# Patient Record
Sex: MALE | Race: WHITE | NOT HISPANIC OR LATINO | Employment: UNEMPLOYED | URBAN - METROPOLITAN AREA
[De-identification: names, ages, dates, MRNs, and addresses within clinical notes are randomized per-mention and may not be internally consistent; named-entity substitution may affect disease eponyms.]

---

## 2017-05-02 ENCOUNTER — HOSPITAL ENCOUNTER (EMERGENCY)
Facility: HOSPITAL | Age: 37
Discharge: HOME | End: 2017-05-03
Attending: EMERGENCY MEDICINE
Payer: MEDICAID

## 2017-05-02 ENCOUNTER — HOSPITAL ENCOUNTER (EMERGENCY)
Facility: OTHER | Age: 37
Discharge: ANOTHER HOSPITAL | End: 2017-05-02
Attending: EMERGENCY MEDICINE
Payer: MEDICAID

## 2017-05-02 VITALS
WEIGHT: 186 LBS | RESPIRATION RATE: 16 BRPM | DIASTOLIC BLOOD PRESSURE: 88 MMHG | WEIGHT: 215 LBS | HEIGHT: 69 IN | HEART RATE: 94 BPM | RESPIRATION RATE: 18 BRPM | SYSTOLIC BLOOD PRESSURE: 145 MMHG | HEART RATE: 103 BPM | OXYGEN SATURATION: 98 % | TEMPERATURE: 98 F | HEIGHT: 70 IN | BODY MASS INDEX: 31.84 KG/M2 | OXYGEN SATURATION: 98 % | BODY MASS INDEX: 26.63 KG/M2 | TEMPERATURE: 98 F | DIASTOLIC BLOOD PRESSURE: 88 MMHG | SYSTOLIC BLOOD PRESSURE: 146 MMHG

## 2017-05-02 DIAGNOSIS — S32.000A LUMBAR COMPRESSION FRACTURE: ICD-10-CM

## 2017-05-02 DIAGNOSIS — M54.41 ACUTE MIDLINE LOW BACK PAIN WITH RIGHT-SIDED SCIATICA: Primary | ICD-10-CM

## 2017-05-02 DIAGNOSIS — M51.36 DDD (DEGENERATIVE DISC DISEASE), LUMBAR: Primary | ICD-10-CM

## 2017-05-02 PROCEDURE — 63600175 PHARM REV CODE 636 W HCPCS: Performed by: EMERGENCY MEDICINE

## 2017-05-02 PROCEDURE — 99285 EMERGENCY DEPT VISIT HI MDM: CPT | Mod: ,,, | Performed by: EMERGENCY MEDICINE

## 2017-05-02 PROCEDURE — 96372 THER/PROPH/DIAG INJ SC/IM: CPT

## 2017-05-02 PROCEDURE — 99285 EMERGENCY DEPT VISIT HI MDM: CPT | Mod: 25

## 2017-05-02 PROCEDURE — 25000003 PHARM REV CODE 250: Performed by: EMERGENCY MEDICINE

## 2017-05-02 PROCEDURE — 99285 EMERGENCY DEPT VISIT HI MDM: CPT | Mod: 27

## 2017-05-02 RX ORDER — IBUPROFEN 400 MG/1
800 TABLET ORAL
Status: COMPLETED | OUTPATIENT
Start: 2017-05-02 | End: 2017-05-02

## 2017-05-02 RX ORDER — IBUPROFEN 600 MG/1
600 TABLET ORAL EVERY 6 HOURS PRN
Qty: 30 TABLET | Refills: 0 | Status: SHIPPED | OUTPATIENT
Start: 2017-05-02 | End: 2017-05-02 | Stop reason: CLARIF

## 2017-05-02 RX ORDER — DEXAMETHASONE SODIUM PHOSPHATE 4 MG/ML
12 INJECTION, SOLUTION INTRA-ARTICULAR; INTRALESIONAL; INTRAMUSCULAR; INTRAVENOUS; SOFT TISSUE
Status: COMPLETED | OUTPATIENT
Start: 2017-05-02 | End: 2017-05-02

## 2017-05-02 RX ORDER — DIAZEPAM 5 MG/1
5 TABLET ORAL
Status: COMPLETED | OUTPATIENT
Start: 2017-05-02 | End: 2017-05-02

## 2017-05-02 RX ORDER — PREDNISONE 20 MG/1
20 TABLET ORAL DAILY
COMMUNITY
End: 2017-12-19 | Stop reason: SDUPTHER

## 2017-05-02 RX ORDER — METHOCARBAMOL 750 MG/1
1500 TABLET, FILM COATED ORAL 3 TIMES DAILY
Qty: 30 TABLET | Refills: 0 | Status: SHIPPED | OUTPATIENT
Start: 2017-05-02 | End: 2017-05-02 | Stop reason: CLARIF

## 2017-05-02 RX ORDER — ORPHENADRINE CITRATE 30 MG/ML
60 INJECTION INTRAMUSCULAR; INTRAVENOUS
Status: COMPLETED | OUTPATIENT
Start: 2017-05-02 | End: 2017-05-02

## 2017-05-02 RX ORDER — CYCLOBENZAPRINE HCL 10 MG
10 TABLET ORAL 3 TIMES DAILY PRN
COMMUNITY
End: 2017-12-19 | Stop reason: DRUGHIGH

## 2017-05-02 RX ORDER — METHYLPREDNISOLONE 4 MG/1
TABLET ORAL
Qty: 1 PACKAGE | Refills: 0 | Status: SHIPPED | OUTPATIENT
Start: 2017-05-02 | End: 2017-05-23

## 2017-05-02 RX ORDER — HYDROCODONE BITARTRATE AND ACETAMINOPHEN 5; 325 MG/1; MG/1
1 TABLET ORAL EVERY 4 HOURS PRN
Qty: 6 TABLET | Refills: 0 | Status: SHIPPED | OUTPATIENT
Start: 2017-05-02 | End: 2017-05-02 | Stop reason: CLARIF

## 2017-05-02 RX ADMIN — ORPHENADRINE CITRATE 60 MG: 30 INJECTION INTRAMUSCULAR; INTRAVENOUS at 01:05

## 2017-05-02 RX ADMIN — IBUPROFEN 800 MG: 400 TABLET, FILM COATED ORAL at 01:05

## 2017-05-02 RX ADMIN — DIAZEPAM 5 MG: 5 TABLET ORAL at 03:05

## 2017-05-02 RX ADMIN — DEXAMETHASONE SODIUM PHOSPHATE 12 MG: 4 INJECTION, SOLUTION INTRAMUSCULAR; INTRAVENOUS at 01:05

## 2017-05-02 NOTE — ED NOTES
. Updated patient on plan of care MD consulting with the transfer center on his continued care and the availability of the services he will need. Patient verbalizes understanding of his care MARKO

## 2017-05-02 NOTE — ED PROVIDER NOTES
"Encounter Date: 5/2/2017       History     Chief Complaint   Patient presents with    Back Pain     states "injuried back attempt to sit on sofa twisted the wrong way".     Review of patient's allergies indicates:   Allergen Reactions    Fish containing products Hives     HPI Comments: Abner Vasquez is a 36 y.o. male who presents to the Emergency Department with  sudden onset low back pain.  Patient states pain came on suddenly while he was sitting down at 10 AM yesterday.  Patient reports it was a moderate pain he went to urgent care was given Flexeril and prednisone over the last few hours pain is been getting significantly worse.  Patient denies injury.  Patient does report pain is radiating to his right butt.    Patient is a 36 y.o. male presenting with the following complaint: back pain. The history is provided by the patient.   Back Pain    This is a new problem. The current episode started yesterday. The problem occurs constantly. The problem has been rapidly worsening. The pain is associated with no known injury. The pain is present in the lumbar spine. The quality of the pain is described as stabbing and shooting. Radiates to: To right butt cheek. The pain is at a severity of 10/10. The symptoms are aggravated by certain positions. The pain is worse during the day. Pertinent negatives include no chest pain, no fever, no numbness, no weight loss, no headaches, no abdominal pain, no abdominal swelling, no bowel incontinence, no perianal numbness, no bladder incontinence, no dysuria, no leg pain, no paresthesias, no paresis, no tingling and no weakness. Treatments tried: Prednisone and Flexeril. The treatment provided no relief. Risk factors include lack of exercise and obesity.     No past medical history on file.  No past surgical history on file.  No family history on file.  Social History   Substance Use Topics    Smoking status: Not on file    Smokeless tobacco: Not on file    Alcohol use Not on " file     Review of Systems   Constitutional: Negative for chills, fever and weight loss.   Respiratory: Negative for shortness of breath and wheezing.    Cardiovascular: Negative for chest pain.   Gastrointestinal: Negative for abdominal pain and bowel incontinence.   Genitourinary: Negative for bladder incontinence and dysuria.   Musculoskeletal: Positive for back pain.   Neurological: Negative for tingling, weakness, numbness, headaches and paresthesias.   All other systems reviewed and are negative.      Physical Exam   Initial Vitals   BP Pulse Resp Temp SpO2   -- 05/02/17 1304 05/02/17 1304 05/02/17 1304 05/02/17 1304    100 19 98.8 °F (37.1 °C) 97 %     Physical Exam    Nursing note and vitals reviewed.  Constitutional: He appears well-developed and well-nourished. He is not diaphoretic. He is Obese . No distress.   HENT:   Head: Normocephalic and atraumatic.   Right Ear: External ear normal.   Left Ear: External ear normal.   Nose: Nose normal.   Mouth/Throat: Oropharynx is clear and moist.   Eyes: EOM are normal. Pupils are equal, round, and reactive to light.   Neck: Normal range of motion. Neck supple.   Cardiovascular: Normal rate, regular rhythm, normal heart sounds and intact distal pulses. Exam reveals no gallop and no friction rub.    No murmur heard.  Pulmonary/Chest: Breath sounds normal. No respiratory distress. He has no wheezes. He has no rhonchi. He has no rales.   Abdominal: Soft. Bowel sounds are normal. He exhibits no distension. There is no rebound and no guarding.   Musculoskeletal: Normal range of motion. He exhibits no edema.        Right shoulder: Normal.        Left shoulder: Normal.        Cervical back: Normal.        Thoracic back: Normal.        Lumbar back: He exhibits pain and spasm. He exhibits no bony tenderness, no swelling, no edema, no deformity and normal pulse.        Back:    Neurological: He is alert and oriented to person, place, and time. He has normal strength and  normal reflexes. He displays normal reflexes. No cranial nerve deficit or sensory deficit.   Skin: Skin is warm and dry.   Psychiatric: He has a normal mood and affect.         ED Course   Procedures  Labs Reviewed - No data to display     Imaging Results         CT Lumbar Spine Without Contrast (Final result) Result time:  05/02/17 13:44:26    Final result by Interface, Rad Results In (05/02/17 13:44:26)    Narrative:    Study Desc:   CT LUMBAR SPINE WITHOUT CONTRAST  Clinical History: Low back pain radiating to the right hip     Comparison: None.     Technique: Sequential transaxial CT images with sagittal and coronal reconstructions   obtained through the lumbar spine.  No intravenous contrast administration.     CT dose: 1190.65 mGy-cm     Findings:     Straightening of normal lumbar lordosis.  Normal vertebral body height and alignment.     No acute lumbar spine fracture.  No aggressive osseous lesions.     L4-L5 and L5-S1 disc space narrowing.  Mild associated endplate osteophytosis.     Mild bilateral facet hypertrophy at T12-L1 and L1-L2.  Mild facet and ligamentum flavum   hypertrophy at L2-L3 and L3-L4.     Circumferential disc bulge with a superimposed right paracentral disc protrusion and   bilateral facet and ligamentum flavum hypertrophy at L4-L5.  Significant spinal canal   stenosis and complete effacement of the right lateral recess with compression of the   descending L5 nerve root are suspected.     Left greater than right facet approach at L5-S1 with a left paracentral disc protrusion   that appears to efface the left lateral recess and compresses the descending left S1   nerve root.     The visualized retroperitoneum is unremarkable.     Impression:     No CT evidence for acute lumbar spine fracture or traumatic subluxation.     Significant spinal canal stenosis and complete effacement of the right lateral recess   with L5 nerve root compression suspected at L4-L5 as above.  Left lateral recess    effacement and S1 nerve root compression suspected at L5-S1 as above.  Findings would be   better assessed by lumbar spine MRI or CT lumbar myelogram.     SL: 24  Signed by: Moiz Young MD  2017-05-02 13:44:22                                   ED Course     CC low back pain worsening after treatment at urgent care.  DDx muscle spasm, fracture, spinal canal stenosis, degenerative joint disease, herniated disc,  sciatica.    Contacted transfer center at 1:50 PM as we do not have MRI Capabilities in this facility.  Requested ED to ED transfer for further evaluation of worsening low back pain.  Contacted transfer center patient will be transferred to [facility] facility for emergent MRI.  Dr. Dickson in ED is aware patient.    Dr. Joseph with neurosurgery was consult and he is agreeable to transfer for MRI  Discussed  imaging results and need for further evaluation.    Patient is agreeable to transfer to [facility] at this time.    Clinical Impression:   The encounter diagnosis was Acute midline low back pain with right-sided sciatica.  Compression of L5 nerve root and spinal canal stenosis          Maya Steven DO  05/02/17 1515

## 2017-05-02 NOTE — DISCHARGE INSTRUCTIONS
Back Pain (Acute or Chronic)    Back pain is one of the most common problems. The good news is that most people feel better in 1 to 2 weeks, and most of the rest in 1 to 2 months. Most people can remain active.  People experience and describe pain differently; not everyone is the same.  · The pain can be sharp, stabbing, shooting, aching, cramping or burning.  · Movement, standing, bending, lifting, sitting, or walking may worsen pain.  · It can be localized to one spot or area, or it can be more generalized.  · It can spread or radiate upwards, to the front, or go down your arms or legs (sciatica).  · It can cause muscle spasm.  Most of the time, mechanical problems with the muscles or spine cause the pain. Mechanical problems are usually caused by an injury to the muscles or ligaments. While illness can cause back pain, it is usually not caused by a serious illness. Mechanical problems include:   · Physical activity such as sports, exercise, work, or normal activity  · Overexertion, lifting, pushing, pulling incorrectly or too aggressively  · Sudden twisting, bending, or stretching from an accident, or accidental movement  · Poor posture  · Stretching or moving wrong, without noticing pain at the time  · Poor coordination, lack of regular exercise (check with your doctor about this)  · Spinal disc disease or arthritis  · Stress  Pain can also be related to pregnancy, or illness like appendicitis, bladder or kidney infections, pelvic infections, and many other things.  Acute back pain usually gets better in 1 to 2 weeks. Back pain related to disk disease, arthritis in the spinal joints or spinal stenosis (narrowing of the spinal canal) can become chronic and last for months or years.  Unless you had a physical injury (for example, a car accident or fall) X-rays are usually not needed for the initial evaluation of back pain. If pain continues and does not respond to medical treatment, X-rays and other tests may be  needed.  Home care  Try these home care recommendations:  · When in bed, try to find a position of comfort. A firm mattress is best. Try lying flat on your back with pillows under your knees. You can also try lying on your side with your knees bent up towards your chest and a pillow between your knees.  · At first, do not try to stretch out the sore spots. If there is a strain, it is not like the good soreness you get after exercising without an injury. In this case, stretching may make it worse.  · Avoid prolong sitting, long car rides, or travel. This puts more stress on the lower back than standing or walking.  · During the first 24 to 72 hours after an acute injury or flare up of chronic back pain, apply an ice pack to the painful area for 20 minutes and then remove it for 20 minutes. Do this over a period of 60 to 90 minutes or several times a day. This will reduce swelling and pain. Wrap the ice pack in a thin towel or plastic to protect your skin.  · You can start with ice, then switch to heat. Heat (hot shower, hot bath, or heating pad) reduces pain and works well for muscle spasms. Heat can be applied to the painful area for 20 minutes then remove it for 20 minutes. Do this over a period of 60 to 90 minutes or several times a day. Do not sleep on a heating pad. It can lead to skin burns or tissue damage.  · You can alternate ice and heat therapy. Talk with your doctor about the best treatment for your back pain.  · Therapeutic massage can help relax the back muscles without stretching them.  · Be aware of safe lifting methods and do not lift anything without stretching first.  Medicines  Talk to your doctor before using medicine, especially if you have other medical problems or are taking other medicines.  · You may use over-the-counter medicine as directed on the bottle to control pain, unless another pain medicine was prescribed. If you have chronic conditions like diabetes, liver or kidney disease,  stomach ulcers, or gastrointestinal bleeding, or are taking blood thinners, talk to your doctor before taking any medicine.  · Be careful if you are given a prescription medicines, narcotics, or medicine for muscle spasms. They can cause drowsiness, affect your coordination, reflexes, and judgement. Do not drive or operate heavy machinery.  Follow-up care  Follow up with your healthcare provider, or as advised.   A radiologist will review any X-rays that were taken. Your provide will notify you of any new findings that may affect your care.  Call 911  Call emergency services if any of the following occur:  · Trouble breathing  · Confusion  · Very drowsy or trouble awakening  · Fainting or loss of consciousness  · Rapid or very slow heart rate  · Loss of bowel or bladder control  When to seek medical advice  Call your healthcare provider right away if any of these occur:   · Pain becomes worse or spreads to your legs  · Weakness or numbness in one or both legs  · Numbness in the groin or genital area  Date Last Reviewed: 7/1/2016 © 2000-2016 [employer]. [address] [place] [address]. All rights reserved. This information is not intended as a substitute for professional medical care. Always follow your healthcare professional's instructions.          Possible Causes of Low Back or Leg Pain    The symptoms in your back or leg may be due to pressure on a nerve. This pressure may be caused by a damaged disk or by abnormal bone growth. Either way, you may feel pain, burning, tingling, or numbness. If you have pressure on a nerve that connects to the sciatic nerve, pain may shoot down your leg.    Pressure from the disk  Constant wear and tear can weaken a disk over time and cause back pain. The disk can then be damaged by a sudden movement or injury. If its soft center begins to bulge, the disk may press on a nerve. Or the outside of the disk may tear, and the soft center may squeeze through  and pinch a nerve.    Pressure from bone  As a disk wears out, the vertebrae right above and below the disk begin to touch. This can put pressure on a nerve. Often, abnormal bone (called bone spurs) grows where the vertebrae rub against each other. This can cause the foramen or the spinal canal to narrow (called stenosis) and press against a nerve.  Date Last Reviewed: 10/4/2015  © 4227-6848 [employer]. [address] [place] [address], [place] [address]. All rights reserved. This information is not intended as a substitute for professional medical care. Always follow your healthcare professional's instructions.          Exercises to Strengthen Your Lower Back  Strong lower back and abdominal muscles work together to support your spine. The exercises below will help strengthen the lower back. It is important that you begin exercising slowly and increase levels gradually.  Always begin any exercise program with stretching. If you feel pain while doing any of these exercises, stop and talk to your doctor about a more specific exercise program that better suits your condition.   Low back stretch  The point of stretching is to make you more flexible and increase your range of motion. Stretch only as much as you are able. Stretch slowly. Do not push your stretch to the limit. If at any point you feel pain while stretching, this is your (temporary) limit.  · Lie on your back with your knees bent and both feet on the ground.  · Slowly raise your left knee to your chest as you flatten your lower back against the floor. Hold for 5 seconds.  · Relax and repeat the exercise with your right knee.  · Do 10 of these exercises for each leg.  · Repeat hugging both knees to your chest at the same time.  Building lower back strength  Start your exercise routine with 10 to 30 minutes a day, 1 to 3 times a day.  Initial exercises  Lying on your back:  1. Ankle pumps: Move your foot up and down, towards your head, and then away.  Repeat 10 times with each foot.  2. Heel slides: Slowly bend your knee, drawing the heel of your foot towards you. Then slide your heel/foot from you, straightening your knee. Do not lift your foot off the floor (this is not a leg lift).  3. Abdominal contraction: Bend your knees and put your hands on your stomach. Tighten your stomach muscles. Hold for 5 seconds, then relax. Repeat 10 times.  4. Straight leg raise: Bend one leg at the knee and keep the other leg straight. Tighten your stomach muscles. Slowly lift your straight leg 6 to 12 inches off the floor and hold for up to 5 seconds. Repeat 10 times on each side.  Standin. Wall squats: Stand with your back against the wall. Move your feet about 12 inches away from the wall. Tighten your stomach muscles, and slowly bend your knees until they are at about a 45 degree angle. Do not go down too far. Hold about 5 seconds. Then slowly return to your starting position. Repeat 10 times.  2. Heel raises: Stand facing the wall. Slowly raise the heels of your feet up and down, while keeping your toes on the floor. If you have trouble balancing, you can touch the wall with your hands. Repeat 10 times.  More advanced exercises  When you feel comfortable enough, try these exercises.  1. Kneeling lumbar extension: Begin on your hands and knees. At the same time, raise and straighten your right arm and left leg until they are parallel to the ground. Hold for 2 seconds and come back slowly to a starting position. Repeat with left arm and right leg, alternating 10 times.  2. Prone lumbar extension: Lie face down, arms extended overhead, palms on the floor. At the same time, raise your right arm and left leg as high as comfortably possible. Hold for 10 seconds and slowly return to start. Repeat with left arm and right leg, alternating 10 times. Gradually build up to 20 times. (Advanced: Repeat this exercise raising both arms and both legs a few inches off the floor at the  same time. Hold for 5 seconds and release.)  3. Pelvic tilt: Lie on the floor on your back with your knees bent at 90 degrees. Your feet should be flat on the floor. Inhale, exhale, then slowly contract your abdominal muscles bringing your navel toward your spine. Let your pelvis rock back until your lower back is flat on the floor. Hold for 10 seconds while breathing smoothly.  4. Abdominal crunch: Perform a pelvic tilt (above) flattening your lower back against the floor. Holding the tension in your abdominal muscles, take another breath and raise your shoulder blades off the ground (this is not a full sit-up). Keep your head in line with your body (dont bend your neck forward). Hold for 2 seconds, then slowly lower.  Date Last Reviewed: 6/1/2016  © 4585-9304 [employer]. [address] [place] [address], [place] [address]. All rights reserved. This information is not intended as a substitute for professional medical care. Always follow your healthcare professional's instructions.

## 2017-05-02 NOTE — ED AVS SNAPSHOT
[facility] EMERGENCY DEPARTMENT  [address] [facility] [facility] [address]               Abner Vasquez   2017 12:58 PM   ED    Description:  Male : 1980   Department:  [facility] Emergency Department           Your Care was Coordinated By:     Provider Role From To    Maya Steven DO Attending Provider 17 1259 --      Reason for Visit     Back Pain           Diagnoses this Visit        Comments    Acute midline low back pain with right-sided sciatica    -  Primary       ED Disposition     None           To Do List           Follow-up Information     Follow up with Tristan Marks MD. Schedule an appointment as soon as possible for a visit in 1 day.    Specialty:  Family Medicine    Contact information:    [address] [facility] [address]  [phone]          Follow up with [facility] Emergency Department.    Specialty:  Emergency Medicine    Why:  If symptoms worsen    Contact information:    [address] [facility] [address]  [phone]       These Medications        Disp Refills Start End    ibuprofen (ADVIL,MOTRIN) 600 MG tablet 30 tablet 0 2017     Take 1 tablet (600 mg total) by mouth every 6 (six) hours as needed for Pain (Take every 6 hours with food for 3 days then as needed for pain). - Oral    methocarbamol (ROBAXIN) 750 MG Tab 30 tablet 0 2017    Take 2 tablets (1,500 mg total) by mouth 3 (three) times daily. - Oral    hydrocodone-acetaminophen 5-325mg (NORCO) 5-325 mg per tablet 6 tablet 0 2017     Take 1 tablet by mouth every 4 (four) hours as needed for Pain (As needed for severe 10 out of 10 pain). - Oral      [facility] On Call     [facility] On Call Nurse Care Line -  Assistance  Unless otherwise directed by your provider, please contact [facility] On-Call, our nurse care line that is available for  assistance.     Registered nurses in the [facility] On Call Center provide: appointment scheduling, clinical advisement, health  education, and other advisory services.  Call: [phone] (toll free)               Medications           Message regarding Medications     Verify the changes and/or additions to your medication regime listed below are the same as discussed with your clinician today.  If any of these changes or additions are incorrect, please notify your healthcare provider.        START taking these NEW medications        Refills    ibuprofen (ADVIL,MOTRIN) 600 MG tablet 0    Sig: Take 1 tablet (600 mg total) by mouth every 6 (six) hours as needed for Pain (Take every 6 hours with food for 3 days then as needed for pain).    Class: Print    Route: Oral    methocarbamol (ROBAXIN) 750 MG Tab 0    Sig: Take 2 tablets (1,500 mg total) by mouth 3 (three) times daily.    Class: Print    Route: Oral    hydrocodone-acetaminophen 5-325mg (NORCO) 5-325 mg per tablet 0    Sig: Take 1 tablet by mouth every 4 (four) hours as needed for Pain (As needed for severe 10 out of 10 pain).    Class: Print    Route: Oral      These medications were administered today        Dose Freq    dexamethasone injection 12 mg 12 mg ED 1 Time    Sig: Inject 3 mLs (12 mg total) into the muscle ED 1 Time.    Class: Normal    Route: Intramuscular    ibuprofen tablet 800 mg 800 mg ED 1 Time    Sig: Take 2 tablets (800 mg total) by mouth ED 1 Time.    Class: Normal    Route: Oral    orphenadrine injection 60 mg 60 mg ED 1 Time    Sig: Inject 2 mLs (60 mg total) into the muscle ED 1 Time.    Class: Normal    Route: Intramuscular           Verify that the below list of medications is an accurate representation of the medications you are currently taking.  If none reported, the list may be blank. If incorrect, please contact your healthcare provider. Carry this list with you in case of emergency.           Current Medications     cyclobenzaprine (FLEXERIL) 10 MG tablet Take 10 mg by mouth 3 (three) times daily as needed for Muscle spasms.    predniSONE (DELTASONE) 20  "MG tablet Take 20 mg by mouth once daily.    dexamethasone injection 12 mg Inject 3 mLs (12 mg total) into the muscle ED 1 Time.    hydrocodone-acetaminophen 5-325mg (NORCO) 5-325 mg per tablet Take 1 tablet by mouth every 4 (four) hours as needed for Pain (As needed for severe 10 out of 10 pain).    ibuprofen (ADVIL,MOTRIN) 600 MG tablet Take 1 tablet (600 mg total) by mouth every 6 (six) hours as needed for Pain (Take every 6 hours with food for 3 days then as needed for pain).    ibuprofen tablet 800 mg Take 2 tablets (800 mg total) by mouth ED 1 Time.    methocarbamol (ROBAXIN) 750 MG Tab Take 2 tablets (1,500 mg total) by mouth 3 (three) times daily.    orphenadrine injection 60 mg Inject 2 mLs (60 mg total) into the muscle ED 1 Time.           Clinical Reference Information           Your Vitals Were     Pulse Temp Resp Height Weight SpO2    100 98.8 °F (37.1 °C) (Temporal) 19 5' 9" (1.753 m) 97.5 kg (215 lb) 97%    BMI                31.75 kg/m2          Allergies as of 5/2/2017        Reactions    Fish Containing Products Hives      Immunizations Administered on Date of Encounter - 5/2/2017     None      ED Micro, Lab, POCT     None      ED Imaging Orders     Start Ordered       Status Ordering Provider    05/02/17 1311 05/02/17 1310  CT Lumbar Spine Without Contrast  1 time imaging      Ordered         Discharge Instructions         Back Pain (Acute or Chronic)    Back pain is one of the most common problems. The good news is that most people feel better in 1 to 2 weeks, and most of the rest in 1 to 2 months. Most people can remain active.  People experience and describe pain differently; not everyone is the same.  · The pain can be sharp, stabbing, shooting, aching, cramping or burning.  · Movement, standing, bending, lifting, sitting, or walking may worsen pain.  · It can be localized to one spot or area, or it can be more generalized.  · It can spread or radiate upwards, to the front, or go down your " arms or legs (sciatica).  · It can cause muscle spasm.  Most of the time, mechanical problems with the muscles or spine cause the pain. Mechanical problems are usually caused by an injury to the muscles or ligaments. While illness can cause back pain, it is usually not caused by a serious illness. Mechanical problems include:   · Physical activity such as sports, exercise, work, or normal activity  · Overexertion, lifting, pushing, pulling incorrectly or too aggressively  · Sudden twisting, bending, or stretching from an accident, or accidental movement  · Poor posture  · Stretching or moving wrong, without noticing pain at the time  · Poor coordination, lack of regular exercise (check with your doctor about this)  · Spinal disc disease or arthritis  · Stress  Pain can also be related to pregnancy, or illness like appendicitis, bladder or kidney infections, pelvic infections, and many other things.  Acute back pain usually gets better in 1 to 2 weeks. Back pain related to disk disease, arthritis in the spinal joints or spinal stenosis (narrowing of the spinal canal) can become chronic and last for months or years.  Unless you had a physical injury (for example, a car accident or fall) X-rays are usually not needed for the initial evaluation of back pain. If pain continues and does not respond to medical treatment, X-rays and other tests may be needed.  Home care  Try these home care recommendations:  · When in bed, try to find a position of comfort. A firm mattress is best. Try lying flat on your back with pillows under your knees. You can also try lying on your side with your knees bent up towards your chest and a pillow between your knees.  · At first, do not try to stretch out the sore spots. If there is a strain, it is not like the good soreness you get after exercising without an injury. In this case, stretching may make it worse.  · Avoid prolong sitting, long car rides, or travel. This puts more stress on the  lower back than standing or walking.  · During the first 24 to 72 hours after an acute injury or flare up of chronic back pain, apply an ice pack to the painful area for 20 minutes and then remove it for 20 minutes. Do this over a period of 60 to 90 minutes or several times a day. This will reduce swelling and pain. Wrap the ice pack in a thin towel or plastic to protect your skin.  · You can start with ice, then switch to heat. Heat (hot shower, hot bath, or heating pad) reduces pain and works well for muscle spasms. Heat can be applied to the painful area for 20 minutes then remove it for 20 minutes. Do this over a period of 60 to 90 minutes or several times a day. Do not sleep on a heating pad. It can lead to skin burns or tissue damage.  · You can alternate ice and heat therapy. Talk with your doctor about the best treatment for your back pain.  · Therapeutic massage can help relax the back muscles without stretching them.  · Be aware of safe lifting methods and do not lift anything without stretching first.  Medicines  Talk to your doctor before using medicine, especially if you have other medical problems or are taking other medicines.  · You may use over-the-counter medicine as directed on the bottle to control pain, unless another pain medicine was prescribed. If you have chronic conditions like diabetes, liver or kidney disease, stomach ulcers, or gastrointestinal bleeding, or are taking blood thinners, talk to your doctor before taking any medicine.  · Be careful if you are given a prescription medicines, narcotics, or medicine for muscle spasms. They can cause drowsiness, affect your coordination, reflexes, and judgement. Do not drive or operate heavy machinery.  Follow-up care  Follow up with your healthcare provider, or as advised.   A radiologist will review any X-rays that were taken. Your provide will notify you of any new findings that may affect your care.  Call 911  Call emergency services if any  of the following occur:  · Trouble breathing  · Confusion  · Very drowsy or trouble awakening  · Fainting or loss of consciousness  · Rapid or very slow heart rate  · Loss of bowel or bladder control  When to seek medical advice  Call your healthcare provider right away if any of these occur:   · Pain becomes worse or spreads to your legs  · Weakness or numbness in one or both legs  · Numbness in the groin or genital area  Date Last Reviewed: 7/1/2016  © 4849-8409 [employer]. [address] [place] [address]. All rights reserved. This information is not intended as a substitute for professional medical care. Always follow your healthcare professional's instructions.          Possible Causes of Low Back or Leg Pain    The symptoms in your back or leg may be due to pressure on a nerve. This pressure may be caused by a damaged disk or by abnormal bone growth. Either way, you may feel pain, burning, tingling, or numbness. If you have pressure on a nerve that connects to the sciatic nerve, pain may shoot down your leg.    Pressure from the disk  Constant wear and tear can weaken a disk over time and cause back pain. The disk can then be damaged by a sudden movement or injury. If its soft center begins to bulge, the disk may press on a nerve. Or the outside of the disk may tear, and the soft center may squeeze through and pinch a nerve.    Pressure from bone  As a disk wears out, the vertebrae right above and below the disk begin to touch. This can put pressure on a nerve. Often, abnormal bone (called bone spurs) grows where the vertebrae rub against each other. This can cause the foramen or the spinal canal to narrow (called stenosis) and press against a nerve.  Date Last Reviewed: 10/4/2015  © 4210-4465 [employer]. [address] [place] [address]. All rights reserved. This information is not intended as a substitute for professional medical care. Always follow your  healthcare professional's instructions.          Exercises to Strengthen Your Lower Back  Strong lower back and abdominal muscles work together to support your spine. The exercises below will help strengthen the lower back. It is important that you begin exercising slowly and increase levels gradually.  Always begin any exercise program with stretching. If you feel pain while doing any of these exercises, stop and talk to your doctor about a more specific exercise program that better suits your condition.   Low back stretch  The point of stretching is to make you more flexible and increase your range of motion. Stretch only as much as you are able. Stretch slowly. Do not push your stretch to the limit. If at any point you feel pain while stretching, this is your (temporary) limit.  · Lie on your back with your knees bent and both feet on the ground.  · Slowly raise your left knee to your chest as you flatten your lower back against the floor. Hold for 5 seconds.  · Relax and repeat the exercise with your right knee.  · Do 10 of these exercises for each leg.  · Repeat hugging both knees to your chest at the same time.  Building lower back strength  Start your exercise routine with 10 to 30 minutes a day, 1 to 3 times a day.  Initial exercises  Lying on your back:  1. Ankle pumps: Move your foot up and down, towards your head, and then away. Repeat 10 times with each foot.  2. Heel slides: Slowly bend your knee, drawing the heel of your foot towards you. Then slide your heel/foot from you, straightening your knee. Do not lift your foot off the floor (this is not a leg lift).  3. Abdominal contraction: Bend your knees and put your hands on your stomach. Tighten your stomach muscles. Hold for 5 seconds, then relax. Repeat 10 times.  4. Straight leg raise: Bend one leg at the knee and keep the other leg straight. Tighten your stomach muscles. Slowly lift your straight leg 6 to 12 inches off the floor and hold for up to 5  seconds. Repeat 10 times on each side.  Standin. Wall squats: Stand with your back against the wall. Move your feet about 12 inches away from the wall. Tighten your stomach muscles, and slowly bend your knees until they are at about a 45 degree angle. Do not go down too far. Hold about 5 seconds. Then slowly return to your starting position. Repeat 10 times.  2. Heel raises: Stand facing the wall. Slowly raise the heels of your feet up and down, while keeping your toes on the floor. If you have trouble balancing, you can touch the wall with your hands. Repeat 10 times.  More advanced exercises  When you feel comfortable enough, try these exercises.  1. Kneeling lumbar extension: Begin on your hands and knees. At the same time, raise and straighten your right arm and left leg until they are parallel to the ground. Hold for 2 seconds and come back slowly to a starting position. Repeat with left arm and right leg, alternating 10 times.  2. Prone lumbar extension: Lie face down, arms extended overhead, palms on the floor. At the same time, raise your right arm and left leg as high as comfortably possible. Hold for 10 seconds and slowly return to start. Repeat with left arm and right leg, alternating 10 times. Gradually build up to 20 times. (Advanced: Repeat this exercise raising both arms and both legs a few inches off the floor at the same time. Hold for 5 seconds and release.)  3. Pelvic tilt: Lie on the floor on your back with your knees bent at 90 degrees. Your feet should be flat on the floor. Inhale, exhale, then slowly contract your abdominal muscles bringing your navel toward your spine. Let your pelvis rock back until your lower back is flat on the floor. Hold for 10 seconds while breathing smoothly.  4. Abdominal crunch: Perform a pelvic tilt (above) flattening your lower back against the floor. Holding the tension in your abdominal muscles, take another breath and raise your shoulder blades off the  ground (this is not a full sit-up). Keep your head in line with your body (dont bend your neck forward). Hold for 2 seconds, then slowly lower.  Date Last Reviewed: 6/1/2016  © 0998-7034 [employer]. [address] [place] [address]. All rights reserved. This information is not intended as a substitute for professional medical care. Always follow your healthcare professional's instructions.          [facility] Sign-Up     Activating your [facility] account is as easy as 1-2-3!     1) Visit [email or web address], select Sign Up Now, enter this activation code and your date of birth, then select Next.  [email or web address]  Expires: 6/16/2017  1:13 PM      2) Create a username and password to use when you visit [facility] in the future and select a security question in case you lose your password and select Next.    3) Enter your e-mail address and click Sign Up!    Additional Information  If you have questions, please e-mail [email or web address] or call [phone] to talk to our [facility] staff. Remember, [facility] is NOT to be used for urgent needs. For medical emergencies, dial 911.         Smoking Cessation     If you would like to quit smoking:   You may be eligible for free services if you are a [place] resident and started smoking cigarettes before September 1, 1988.  Call the Smoking Cessation Trust ([facility]) toll free at (529) 144-5607 or (625) 225-5557.   Call 2-800-QUIT-NOW if you do not meet the above criteria.   Contact us via email: [email or web address]   View our website for more information: [email or web address]         [facility] Emergency Department complies with applicable Federal civil rights laws and does not discriminate on the basis of race, color, national origin, age, disability, or sex.        Language Assistance Services     ATTENTION: Language assistance services are available, free of charge. Please call [phone].      ATENCIÓN: manolo Mcmillan  cano disposición servicios gratuitos de asistencia lingüística. Llame al [phone].     KYLE Ý: N?u b?n nói Ti?ng Vi?t, có các d?ch v? h? tr? ngôn ng? mi?n phí dành cho b?n. G?i s? [phone].

## 2017-05-02 NOTE — ED NOTES
2nd Attempt to call report for a patient in route to the facility call answered by  placed on hold no answer by unit staff. Attempt to give report unsuccessful.

## 2017-05-02 NOTE — ED TRIAGE NOTES
35 y/o male presents to the ER with his sister with the cc of lower back pain that suddenly began after trying to sit on the sofa. Patient reports he went to sit down on the sofa and his right knee gave out on him and he fell back on the sofa . Patient reports when his bottom hit the sofa he immediately jumped up from the sofa in pain at the lower back. Described the pain as a shocking feeling that was sharp. Denies hitting the floor or any previous trauma to the lower back. Patient ambulatory to the room with pain to the legs and back. No deformity noted.

## 2017-05-02 NOTE — ED NOTES
MD Steven notified that care report to the receiving facility was not given. Care report to EMS was given appropriately patient care handoff was received by EMS Personnel

## 2017-05-02 NOTE — ED AVS SNAPSHOT
[facility]  [address] [facility] [address]               Abner Vasquez   2017  7:39 PM   ED    Description:  Male : 1980   Department:  [facility]           Your Care was Coordinated By:     Provider Role From To    Matty Baxter MD Attending Provider 17 1120 --      Reason for Visit     Transfer           Diagnoses this Visit        Comments    DDD (degenerative disc disease), lumbar    -  Primary     Lumbar compression fracture           ED Disposition     ED Disposition Condition Comment    Discharge             To Do List           Follow-up Information     Follow up with Adrian [facility] - Internal Medicine.    Specialty:  Internal Medicine    Why:  call to set up new PCP    Contact information:    [address] [facility] [id number] [phone]    Additional information:    [facility]        Follow up with Adrian [facility] - Sp[facility]th Fl.    Specialty:  Neurosurgery    Contact information:    [address] [facility] [id number] [phone]        [facility]    Specialty:  Emergency Medicine    Why:  ASAP for new or worsening symptoms    Contact information:    [address] [facility] [id number] [phone]       These Medications        Disp Refills Start End    methylPREDNISolone (MEDROL DOSEPACK) 4 mg tablet 1 Package 0 2017    Take per package instructions      [facility]     [facility] Care Line -  Assistance  Unless otherwise directed by your provider, please contact [facility], our nurse care line that is available for  assistance.     Registered nurses in the [facility] provide: appointment scheduling, clinical advisement, health education, and other advisory services.  Call: [phone] (toll free)               Medications          "  Message regarding Medications     Verify the changes and/or additions to your medication regime listed below are the same as discussed with your clinician today.  If any of these changes or additions are incorrect, please notify your healthcare provider.        START taking these NEW medications        Refills    methylPREDNISolone (MEDROL DOSEPACK) 4 mg tablet 0    Sig: Take per package instructions    Class: Print           Verify that the below list of medications is an accurate representation of the medications you are currently taking.  If none reported, the list may be blank. If incorrect, please contact your healthcare provider. Carry this list with you in case of emergency.           Current Medications     cyclobenzaprine (FLEXERIL) 10 MG tablet Take 10 mg by mouth 3 (three) times daily as needed for Muscle spasms.    predniSONE (DELTASONE) 20 MG tablet Take 20 mg by mouth once daily.    methylPREDNISolone (MEDROL DOSEPACK) 4 mg tablet Take per package instructions           Clinical Reference Information           Your Vitals Were     BP Pulse Temp Resp Height Weight    146/88 94 98 °F (36.7 °C) (Oral) 16 5' 10" (1.778 m) 84.4 kg (186 lb)    SpO2 BMI             98% 26.69 kg/m2         Allergies as of 5/2/2017        Reactions    Fish Containing Products Hives      Immunizations Administered on Date of Encounter - 5/2/2017     None      ED Micro, Lab, POCT     None      ED Imaging Orders     Start Ordered       Status Ordering Provider    05/02/17 1827 05/02/17 1826  MRI Lumbar Spine Without Contrast  1 time imaging      Final result         Discharge Instructions         Degenerative Disk Disease    Spinal disks are gel-filled cushions between the bones, or vertebrae, of the spine. The disks act like shock absorbers. Over time, the disks may break down. This is called degenerative disk disease.  This condition can affect the neck or back. It is one of the most common causes of low back pain. It is the " leading cause of disability in people under age 45 in the [place]. The pain often remains localized to the lower back or neck. Muscle spasm is often present and adds to the pain.  Disk degeneration is a natural part of aging. But it is not painful for most people. It may also occur as a result of repeated minor injuries due to daily activities, sports, or accidents. It may lead to osteoarthritis of the spine. Back pain related to disk disease may come and go. Or it may become chronic and last for months or years. The disk may bulge or rupture. This is called a slipped disk or herniated disk. That can put pressure on a nearby spinal nerve and cause neck or back pain that spreads down one arm or leg.  X-rays or an MRI may help to diagnose this condition. For acute pain, treatment includes anti-inflammatory medicines, muscle relaxants, rest, ice, or heat. Strong prescription pain medicines, called opioids, may be needed for short-term treatment if pain suddenly gets worse. Opioid medicines can be addictive. So they are not advised for long-term pain management. Other types of medicines are preferred. Surgery is generally not used to treat this condition unless there is a complication.    Home care  · For neck pain: Use a comfortable pillow that supports the head and keeps the spine in a neutral position. Your head should not be tilted forward or backward.  · For back pain: Avoid sitting for long periods of time. This puts more stress on the lower back than standing or walking. Starting a regular exercise program to strengthen the supporting muscles of the spine will make it easier to live with degenerative disk disease.  · Apply an ice pack over the injured area for no more than 15 to 20 minutes. Do this every 3 to 6 hours for the first 24 to 48 hours. To make an ice pack, put ice cubes in a plastic bag that seals at the top. Wrap the bag in a clean, thin towel or cloth. Never put ice or an ice pack directly on  the skin. Keep using ice packs to ease pain and swelling as needed. After 48 hours, apply heat (warm shower or warm bath) for 20 minutes several times a day, or switch between ice and heat.   · You may use over-the-counter pain medicine to control pain, unless another pain medicine was prescribed. If you have chronic liver or kidney disease or ever had a stomach ulcer or GI bleeding, talk with your provider before using these medicines.  Follow-up care  Follow up with your healthcare provider, or as directed.  If X-rays, a CT scan or an MRI were done, you will be notified of any new findings that may affect your care.  When to seek medical advice  Contact your healthcare provider right away if any of these occur:  · Increasing back pain  · Your foot drags when you walk, a condition called foot drop  · You have new weakness, numbness, or pain in one or both arms or legs  · Loss of bowel or bladder control  · Numbness or tingling in the buttock or groin area  Date Last Reviewed: 11/23/2015  © 4688-4274 [employer]. [address] [place] [address]. All rights reserved. This information is not intended as a substitute for professional medical care. Always follow your healthcare professional's instructions.          [facility] Sign-Up     Activating your [facility] account is as easy as 1-2-3!     1) Visit [email or web address], select Sign Up Now, enter this activation code and your date of birth, then select Next.  [email or web address]  Expires: 6/16/2017  1:13 PM      2) Create a username and password to use when you visit [facility] in the future and select a security question in case you lose your password and select Next.    3) Enter your e-mail address and click Sign Up!    Additional Information  If you have questions, please e-mail [email or web address] or call [phone] to talk to our [facility] staff. Remember, [facility] is NOT to be used for urgent needs. For medical emergencies, dial 911.           [facility] complies with applicable Federal civil rights laws and does not discriminate on the basis of race, color, national origin, age, disability, or sex.        Language Assistance Services     ATTENTION: Language assistance services are available, free of charge. Please call [phone].      ATENCIÓN: Si habla brendan, tiene a cano disposición servicios gratuitos de asistencia lingüística. Llame al [phone].     CHÚ Ý: N?u b?n nói Ti?ng Vi?t, có các d?ch v? h? tr? ngôn ng? mi?n phí dành cho b?n. G?i s? [phone].

## 2017-05-02 NOTE — ED NOTES
Attempt to [facility] no success. [phone]. Phone received from transfer center.

## 2017-05-03 NOTE — DISCHARGE INSTRUCTIONS
Degenerative Disk Disease    Spinal disks are gel-filled cushions between the bones, or vertebrae, of the spine. The disks act like shock absorbers. Over time, the disks may break down. This is called degenerative disk disease.  This condition can affect the neck or back. It is one of the most common causes of low back pain. It is the leading cause of disability in people under age 45 in the [place]. The pain often remains localized to the lower back or neck. Muscle spasm is often present and adds to the pain.  Disk degeneration is a natural part of aging. But it is not painful for most people. It may also occur as a result of repeated minor injuries due to daily activities, sports, or accidents. It may lead to osteoarthritis of the spine. Back pain related to disk disease may come and go. Or it may become chronic and last for months or years. The disk may bulge or rupture. This is called a slipped disk or herniated disk. That can put pressure on a nearby spinal nerve and cause neck or back pain that spreads down one arm or leg.  X-rays or an MRI may help to diagnose this condition. For acute pain, treatment includes anti-inflammatory medicines, muscle relaxants, rest, ice, or heat. Strong prescription pain medicines, called opioids, may be needed for short-term treatment if pain suddenly gets worse. Opioid medicines can be addictive. So they are not advised for long-term pain management. Other types of medicines are preferred. Surgery is generally not used to treat this condition unless there is a complication.    Home care  · For neck pain: Use a comfortable pillow that supports the head and keeps the spine in a neutral position. Your head should not be tilted forward or backward.  · For back pain: Avoid sitting for long periods of time. This puts more stress on the lower back than standing or walking. Starting a regular exercise program to strengthen the supporting muscles of the spine will make it easier  to live with degenerative disk disease.  · Apply an ice pack over the injured area for no more than 15 to 20 minutes. Do this every 3 to 6 hours for the first 24 to 48 hours. To make an ice pack, put ice cubes in a plastic bag that seals at the top. Wrap the bag in a clean, thin towel or cloth. Never put ice or an ice pack directly on the skin. Keep using ice packs to ease pain and swelling as needed. After 48 hours, apply heat (warm shower or warm bath) for 20 minutes several times a day, or switch between ice and heat.   · You may use over-the-counter pain medicine to control pain, unless another pain medicine was prescribed. If you have chronic liver or kidney disease or ever had a stomach ulcer or GI bleeding, talk with your provider before using these medicines.  Follow-up care  Follow up with your healthcare provider, or as directed.  If X-rays, a CT scan or an MRI were done, you will be notified of any new findings that may affect your care.  When to seek medical advice  Contact your healthcare provider right away if any of these occur:  · Increasing back pain  · Your foot drags when you walk, a condition called foot drop  · You have new weakness, numbness, or pain in one or both arms or legs  · Loss of bowel or bladder control  · Numbness or tingling in the buttock or groin area  Date Last Reviewed: 11/23/2015  © 1461-0678 [employer]. [address] [place] [address], [place] [address]. All rights reserved. This information is not intended as a substitute for professional medical care. Always follow your healthcare professional's instructions.

## 2017-05-03 NOTE — CONSULTS
Consult Note  Neurosurgery    Consult Requested By: ed  Reason for Consult: lumbar radiculopathy    SUBJECTIVE:     History of Present Illness:  Patient is a 36 y.o. male presents with brief shooting pain down R leg. He denied any weakness, numbness, incontinence. Pain has significantly improved since arrival.     Scheduled Meds:  Continuous Infusions:  PRN Meds:    Review of patient's allergies indicates:   Allergen Reactions    Fish containing products Hives       Past Medical History:   Diagnosis Date    Chronic back pain      History reviewed. No pertinent surgical history.  History reviewed. No pertinent family history.  Social History   Substance Use Topics    Smoking status: Current Every Day Smoker     Packs/day: 1.00     Types: Cigarettes    Smokeless tobacco: None    Alcohol use No        Review of Systems:  Constitutional: Negative for chills, fever and weight loss.   Respiratory: Negative for shortness of breath and wheezing.   Cardiovascular: Negative for chest pain.   Gastrointestinal: Negative for abdominal pain and bowel incontinence.   Genitourinary: Negative for bladder incontinence and dysuria.   Musculoskeletal: Positive for back pain.   Neurological: Negative for tingling, weakness, numbness, headaches and paresthesias.   All other systems reviewed and are negative.    OBJECTIVE:     Vital Signs (Most Recent)  Temp: 98 °F (36.7 °C) (05/02/17 1614)  Pulse: 94 (05/02/17 1614)  Resp: 16 (05/02/17 1614)  BP: (!) 146/88 (05/02/17 1614)  SpO2: 98 % (05/02/17 1614)    Vital Signs Range (Last 24H):  Temp:  [97.9 °F (36.6 °C)-98.8 °F (37.1 °C)]   Pulse:  []   Resp:  [16-19]   BP: (145-146)/(88)   SpO2:  [97 %-98 %]     Physical Exam:  Constitutional: He appears well-developed and well-nourished. He is not diaphoretic. He is Obese . No distress.   HENT:   Head: Normocephalic and atraumatic.   Right Ear: External ear normal.   Left Ear: External ear normal.   Nose: Nose normal.   Mouth/Throat:  Oropharynx is clear and moist.   Eyes: EOM are normal. Pupils are equal, round, and reactive to light.   Neck: Normal range of motion. Neck supple.   Cardiovascular: Normal rate, regular rhythm, normal heart sounds and intact distal pulses. Exam reveals no gallop and no friction rub.   No murmur heard.  Pulmonary/Chest: Breath sounds normal. No respiratory distress. He has no wheezes. He has no rhonchi. He has no rales.   Abdominal: Soft. Bowel sounds are normal. He exhibits no distension. There is no rebound and no guarding.   Musculoskeletal: Normal range of motion. He exhibits no edema.   Right shoulder: Normal.   Left shoulder: Normal.   Cervical back: Normal.   Thoracic back: Normal.   Neurological: He is alert and oriented to person, place, and time. He has normal strength and normal reflexes. He displays normal reflexes. No cranial nerve deficit or sensory deficit.   Skin: Skin is warm and dry.   Psychiatric: He has a normal mood and affect.     Laboratory:  CBC: No results for input(s): WBC, RBC, HGB, HCT, PLT, MCV, MCH, MCHC in the last 168 hours.  BMP: No results for input(s): GLU, NA, K, CL, CO2, BUN, CREATININE, CALCIUM, MG in the last 168 hours.  CMP: No results for input(s): GLU, CALCIUM, ALBUMIN, PROT, NA, K, CO2, CL, BUN, CREATININE, ALKPHOS, ALT, AST, BILITOT in the last 168 hours.  Coagulation: No results for input(s): INR, APTT in the last 168 hours.    Invalid input(s): PT    Diagnostic Results:  CT: Reviewed  MRI: Reviewed    ASSESSMENT/PLAN:     L4,5 and L5,S1 neuroforaminal stenosis    -no neurosurgical intervention  -will follow up in clinic in 1 month  -dc on medrol dose pack

## 2017-05-03 NOTE — ED TRIAGE NOTES
Was transferred here for MRI of lower back. Had a back injury years ago and is now having pain that has progressively getting wors.e

## 2017-05-03 NOTE — ED PROVIDER NOTES
Encounter Date: 5/2/2017    SCRIBE #1 NOTE: I, Lisa Viry, am scribing for, and in the presence of, Dr. Baxter.       History     Chief Complaint   Patient presents with    Transfer     From [facility] for MRI for compression fracture.      Review of patient's allergies indicates:   Allergen Reactions    Fish containing products Hives     HPI Comments: 36 y.o. male presents in transfer for a further evaluation of right sided low back pain that radiates to the buttock. His pain started suddenly yesterday while he was sitting down. He was given flexeril and prednisone yesterday at urgent care but has continued to have worsening symptoms so he reported to an outside ED. He was reevaluated given his worsening pain. His pain is worse with movement and he denies fever, trauma, bowel or bladder incontinence or retention. He has no saddle anesthesia and no lower extremity weakness or paraesthesias.    Evaluation at the outside ED included a CT which showed concern for significant spinal canal stenosis and effacement of the right lateral recess with L5 nerve root compression. An MRI was recommended and was discussed with neurosurgery. He was transferred for definitive evaluation and management.     The history is provided by the patient.     Past Medical History:   Diagnosis Date    Chronic back pain      History reviewed. No pertinent surgical history.  History reviewed. No pertinent family history.  Social History   Substance Use Topics    Smoking status: Current Every Day Smoker     Packs/day: 1.00     Types: Cigarettes    Smokeless tobacco: None    Alcohol use No     Review of Systems   Constitutional: Negative for chills and fever.   HENT: Negative for facial swelling and nosebleeds.    Eyes: Negative for visual disturbance.   Respiratory: Negative for cough, shortness of breath and wheezing.    Cardiovascular: Negative for chest pain and palpitations.   Gastrointestinal: Negative for abdominal distention,  abdominal pain, diarrhea, nausea and vomiting.   Genitourinary: Negative for difficulty urinating, dysuria, frequency and hematuria.   Musculoskeletal: Positive for back pain. Negative for neck pain and neck stiffness.   Skin: Negative for rash.   Neurological: Negative for weakness and headaches.   All other systems reviewed and are negative.      Physical Exam   Initial Vitals   BP Pulse Resp Temp SpO2   05/02/17 1614 05/02/17 1614 05/02/17 1614 05/02/17 1614 05/02/17 1614   146/88 94 16 98 °F (36.7 °C) 98 %     Physical Exam    Nursing note and vitals reviewed.  Constitutional: He appears well-developed and well-nourished. He is not diaphoretic. He is Obese . No distress.   HENT:   Head: Normocephalic and atraumatic.   Mouth/Throat: Oropharynx is clear and moist.   Eyes: EOM are normal. Pupils are equal, round, and reactive to light.   Neck: Normal range of motion. Neck supple.   Cardiovascular: Normal rate, regular rhythm, normal heart sounds and intact distal pulses.   Pulmonary/Chest: Breath sounds normal. No respiratory distress. He has no wheezes. He has no rhonchi. He has no rales.   Abdominal: Soft. Bowel sounds are normal. He exhibits no distension. There is no rebound and no guarding.   Musculoskeletal: Normal range of motion. He exhibits no edema.        Lumbar back: He exhibits pain and spasm. He exhibits no bony tenderness, no swelling, no edema, no deformity and normal pulse.        Back:    Neurological: He is alert and oriented to person, place, and time. He has normal strength and normal reflexes. No cranial nerve deficit or sensory deficit.   Skin: Skin is warm and dry.   Psychiatric: He has a normal mood and affect.         ED Course   Procedures  Labs Reviewed - No data to display          Medical Decision Making:   History:   Old Medical Records: I decided to obtain old medical records.  Initial Assessment:   36 y.o. Male presents with right lower back pain with sciatica presents for  evaluation given an abnormal CT scan and need for definitive evaluation and management of these suspected findings. All imaging from the outside facility was reviewed. There he was given dexamethasone, vallum, ibuprofen, and norflex. Neurosurgical consult and MRI was ordered upon his arrival. He is neurovascularly intact at the time of my initial assessment.   Clinical Tests:   Radiological Study: Ordered and Reviewed  ED Management:  7:00 PM   Neurosurgery consult     MRI shows no cord compression and only evidence of canal stenosis of lumbar region as described in the report. He is completely neurologically intact, initially and on reassessment. Neurosurgery has evaluated and we feel he is stable for discharge with a course of steroids and PCP and spine follow up. He understands to follow up and to return immediatly for new or worsening symptoms.   Other:   I have discussed this case with another health care provider.            Scribe Attestation:   Scribe #1: I performed the above scribed service and the documentation accurately describes the services I performed. I attest to the accuracy of the note.    Attending Attestation:           Physician Attestation for Scribe:  Physician Attestation Statement for Scribe #1: I, Dr. Baxter, reviewed documentation, as scribed by Lisa Baires in my presence, and it is both accurate and complete.                 ED Course     Clinical Impression:   The primary encounter diagnosis was DDD (degenerative disc disease), lumbar. A diagnosis of Lumbar compression fracture was also pertinent to this visit.    Disposition:   Disposition: Discharged  Condition: Stable       Matty Baxter MD  05/03/17 0101

## 2017-05-04 ENCOUNTER — TELEPHONE (OUTPATIENT)
Dept: NEUROSURGERY | Facility: CLINIC | Age: 37
End: 2017-05-04

## 2017-05-04 NOTE — TELEPHONE ENCOUNTER
Left voice message advising patient, per Neurosurgery consult note, he it to follow-up in 1 month. Appointment scheduled with Fiona Longoria PA-C on Tuesday 6/6/17 at 10:40am. Appointment slip mailed to address on file. Call back number provided for questions or concerns.

## 2017-05-04 NOTE — TELEPHONE ENCOUNTER
----- Message from Andrews Lennon sent at 5/4/2017 11:10 AM CDT -----  Contact: Self [phone]  Patient has discharge instruction to: Follow up with [facility] - Spine Class 7th Fl (Neurosurgery), pls call

## 2017-05-08 ENCOUNTER — OFFICE VISIT (OUTPATIENT)
Dept: INTERNAL MEDICINE | Facility: CLINIC | Age: 37
End: 2017-05-08
Attending: INTERNAL MEDICINE
Payer: MEDICAID

## 2017-05-08 VITALS
WEIGHT: 212.06 LBS | BODY MASS INDEX: 31.41 KG/M2 | SYSTOLIC BLOOD PRESSURE: 124 MMHG | OXYGEN SATURATION: 94 % | HEART RATE: 110 BPM | HEIGHT: 69 IN | TEMPERATURE: 98 F | DIASTOLIC BLOOD PRESSURE: 78 MMHG

## 2017-05-08 DIAGNOSIS — M54.31 SCIATICA, RIGHT SIDE: Primary | ICD-10-CM

## 2017-05-08 PROCEDURE — 99999 PR PBB SHADOW E&M-EST. PATIENT-LVL III: CPT | Mod: PBBFAC,,, | Performed by: INTERNAL MEDICINE

## 2017-05-08 PROCEDURE — 99203 OFFICE O/P NEW LOW 30 MIN: CPT | Mod: S$PBB,,, | Performed by: INTERNAL MEDICINE

## 2017-05-08 PROCEDURE — 99213 OFFICE O/P EST LOW 20 MIN: CPT | Mod: PBBFAC,PO | Performed by: INTERNAL MEDICINE

## 2017-05-08 RX ORDER — IBUPROFEN 800 MG/1
800 TABLET ORAL 2 TIMES DAILY
Qty: 60 TABLET | Refills: 0 | Status: SHIPPED | OUTPATIENT
Start: 2017-05-08 | End: 2017-09-03 | Stop reason: ALTCHOICE

## 2017-05-08 NOTE — PATIENT INSTRUCTIONS
Take ibuprofen twice per day for 2 week      Sciatica    Sciatica is a condition that causes pain in the lower back and down into the buttock, hip, and leg. Sometimes the leg pain can happen without any back pain. Sciatica happens when a spinal nerve is irritated or has pressure put on it as comes out of the spinal canal in the lower back. This most often happens when a bulge or rupture of a nearby spinal disk presses on the nerve. Sciatica can also be caused by a narrowing of the spinal canal (spinal stenosis) or spasm of the muscle in the buttocks that the sciatic nerve passes through (pyriform muscle). Sciatica is also called lumbar radiculopathy.  Sciatica may begin after a sudden twisting or bending force, such as in a car accident. Or it can happen after a simple awkward movement. In either case, muscle spasm often also happens. Muscle spasm makes the pain worse.  A health care provider makes a diagnosis of sciatica from your symptoms and a physical exam. Unless you had an injury from a car accident or fall, you usually wont have X-rays taken at this time. This is because the nerves and disks in your back cant be seen on an X-ray. If the provider sees signs of a compressed nerve, you will need to schedule an MRI scan as an outpatient. Signs of a compressed nerve include loss of strength in a leg.  Most sciatica gets better with medicine, exercise, and physical therapy. If your symptoms continue after at least 3 months of medical treatment, you may need surgery.  Home care  Follow these tips when caring for yourself at home:  · You may need to stay in bed the first few days. But as soon as possible, begin sitting or walking. This will help you avoid problems that come from staying in bed for long periods.  · When in bed, try to find a position that is comfortable. A firm mattress is best. Try lying flat on your back with pillows under your knees. You can also try lying on your side with your knees bent up  toward your chest and a pillow between your knees.  · Avoid sitting for long periods. This puts more stress on your lower back than standing or walking.  · Use heat from a hot shower, hot bath, or heating pad to help ease pain. Massage can also help. You can also try using an ice pack. You can make your own ice pack by putting ice cubes in a plastic bag. Wrap the bag in a thin towel. Try both heat and cold to see which works best. Use the method that feels best for 20 minutes several times a day.  · You may use acetaminophen or ibuprofen to ease pain, unless another pain medicine was prescribed. Note: If you have chronic liver or kidney disease, talk with your health care provider before taking these medicines. Also talk with your provider if youve had a stomach ulcer or GI bleeding.  · Use safe lifting methods. Dont lift anything heavier than 15 pounds until all of the pain is gone.  Follow-up care  Follow up with your health care provider if your symptoms dont start to get better after 1 week. You may need physical therapy or additional tests.  If X-rays were taken, they will be looked at by a radiologist. You will be told of any new findings that may affect your care.  When to seek medical advice  Call your health care provider right away if any of these occur:  · Pain gets worse even after taking prescribed medicine  · Weakness or numbness in 1 or both legs or hips  · Numbness in your groin or genital area  · You cant control your bowel or bladder  · Fever  · Redness or swelling over your back or spine   © 4215-5086 [employer]. [address] [place] [address], [place] [address]. All rights reserved. This information is not intended as a substitute for professional medical care. Always follow your healthcare professional's instructions.            Understanding Sciatica  Sciatica is leg pain often due to pressure on                         a nerve in your low back that connects to the sciatic nerve.  This pressure may be caused by a damaged disk or by abnormal bone growth. You may feel pain, burning, tingling, or numbness that shoots down your leg.   Pressure from a Damaged Disk  Constant wear and tear on a disk can cause it to weaken. Part of the disk                 may push outward (herniate). Part of the disk may then press on nearby nerves. If this nerve leads to the sciatic nerve, you may feel pain down your leg.   Pressure from Bone  A disk can wear out and thin with age. This can cause the vertebrae above and below the disk to touch, putting pressure on a nerve. Abnormal bone growths called bone spurs can also form where the bones rub together. These can narrow the spinal canal and press on nerves.     © 1355-0112 [employer]. [address] [place] [address], [place] [address]. All rights reserved. This information is not intended as a substitute for professional medical care. Always follow your healthcare professional's instructions.

## 2017-05-08 NOTE — MR AVS SNAPSHOT
[facility]  [address]  [place] [address]  Phone: [phone]  Fax: [phone]                  Abner Vasquez   2017 2:20 PM   Office Visit    Description:  Male : 1980   Provider:  Fred Resendiz MD   Department:  [facility]           Reason for Visit     Establish Care     Follow-up           Diagnoses this Visit        Comments    Sciatica, right side    -  Primary            To Do List           Future Appointments        Provider Department Dept Phone    2017 10:40 AM Fiona Longoria PA-C [facility] - Neurosurgery 7th Fl [phone]      Goals (5 Years of Data)     None      Follow-Up and Disposition     Return in about 3 months (around 2017).       These Medications        Disp Refills Start End    ibuprofen (ADVIL,MOTRIN) 800 MG tablet 60 tablet 0 2017     Take 1 tablet (800 mg total) by mouth 2 (two) times daily. - Oral    Pharmacy: [facility]/pharmacy #[id number] - [place] - [address]  #: [phone]         [facility]     [facility] -  Assistance  Unless otherwise directed by your provider, please contact [facility], our nurse care line that is available for  assistance.     Registered nurses in the [facility] provide: appointment scheduling, clinical advisement, health education, and other advisory services.  Call: [phone] (toll free)               Medications           Message regarding Medications     Verify the changes and/or additions to your medication regime listed below are the same as discussed with your clinician today.  If any of these changes or additions are incorrect, please notify your healthcare provider.        START taking these NEW medications        Refills    ibuprofen (ADVIL,MOTRIN) 800 MG tablet 0    Sig: Take 1 tablet (800 mg total) by mouth 2 (two) times daily.    Class: Normal    Route: Oral           Verify that the below list of  "medications is an accurate representation of the medications you are currently taking.  If none reported, the list may be blank. If incorrect, please contact your healthcare provider. Carry this list with you in case of emergency.           Current Medications     cyclobenzaprine (FLEXERIL) 10 MG tablet Take 10 mg by mouth 3 (three) times daily as needed for Muscle spasms.    ibuprofen (ADVIL,MOTRIN) 800 MG tablet Take 1 tablet (800 mg total) by mouth 2 (two) times daily.    methylPREDNISolone (MEDROL DOSEPACK) 4 mg tablet Take per package instructions    predniSONE (DELTASONE) 20 MG tablet Take 20 mg by mouth once daily.           Clinical Reference Information           Your Vitals Were     BP Pulse Temp Height Weight SpO2    124/78 (BP Location: Left arm, Patient Position: Sitting, BP Method: Manual) 110 97.7 °F (36.5 °C) 5' 9" (1.753 m) 96.2 kg (212 lb 1.3 oz) 94%    BMI                31.32 kg/m2          Blood Pressure          Most Recent Value    BP  124/78      Allergies as of 5/8/2017     Fish Containing Products      Immunizations Administered on Date of Encounter - 5/8/2017     None      [facility] Sign-Up     Activating your [facility] account is as easy as 1-2-3!     1) Visit [email or web address], select Sign Up Now, enter this activation code and your date of birth, then select Next.  [email or web address]  Expires: 6/16/2017  1:13 PM      2) Create a username and password to use when you visit [facility] in the future and select a security question in case you lose your password and select Next.    3) Enter your e-mail address and click Sign Up!    Additional Information  If you have questions, please e-mail [email or web address] or call [phone] to talk to our [facility] staff. Remember, [facility] is NOT to be used for urgent needs. For medical emergencies, dial 911.         Instructions    Take ibuprofen twice per day for 2 week      Sciatica    Sciatica is a condition that causes pain in the lower back and " down into the buttock, hip, and leg. Sometimes the leg pain can happen without any back pain. Sciatica happens when a spinal nerve is irritated or has pressure put on it as comes out of the spinal canal in the lower back. This most often happens when a bulge or rupture of a nearby spinal disk presses on the nerve. Sciatica can also be caused by a narrowing of the spinal canal (spinal stenosis) or spasm of the muscle in the buttocks that the sciatic nerve passes through (pyriform muscle). Sciatica is also called lumbar radiculopathy.  Sciatica may begin after a sudden twisting or bending force, such as in a car accident. Or it can happen after a simple awkward movement. In either case, muscle spasm often also happens. Muscle spasm makes the pain worse.  A health care provider makes a diagnosis of sciatica from your symptoms and a physical exam. Unless you had an injury from a car accident or fall, you usually wont have X-rays taken at this time. This is because the nerves and disks in your back cant be seen on an X-ray. If the provider sees signs of a compressed nerve, you will need to schedule an MRI scan as an outpatient. Signs of a compressed nerve include loss of strength in a leg.  Most sciatica gets better with medicine, exercise, and physical therapy. If your symptoms continue after at least 3 months of medical treatment, you may need surgery.  Home care  Follow these tips when caring for yourself at home:  · You may need to stay in bed the first few days. But as soon as possible, begin sitting or walking. This will help you avoid problems that come from staying in bed for long periods.  · When in bed, try to find a position that is comfortable. A firm mattress is best. Try lying flat on your back with pillows under your knees. You can also try lying on your side with your knees bent up toward your chest and a pillow between your knees.  · Avoid sitting for long periods. This puts more stress on your lower  back than standing or walking.  · Use heat from a hot shower, hot bath, or heating pad to help ease pain. Massage can also help. You can also try using an ice pack. You can make your own ice pack by putting ice cubes in a plastic bag. Wrap the bag in a thin towel. Try both heat and cold to see which works best. Use the method that feels best for 20 minutes several times a day.  · You may use acetaminophen or ibuprofen to ease pain, unless another pain medicine was prescribed. Note: If you have chronic liver or kidney disease, talk with your health care provider before taking these medicines. Also talk with your provider if youve had a stomach ulcer or GI bleeding.  · Use safe lifting methods. Dont lift anything heavier than 15 pounds until all of the pain is gone.  Follow-up care  Follow up with your health care provider if your symptoms dont start to get better after 1 week. You may need physical therapy or additional tests.  If X-rays were taken, they will be looked at by a radiologist. You will be told of any new findings that may affect your care.  When to seek medical advice  Call your health care provider right away if any of these occur:  · Pain gets worse even after taking prescribed medicine  · Weakness or numbness in 1 or both legs or hips  · Numbness in your groin or genital area  · You cant control your bowel or bladder  · Fever  · Redness or swelling over your back or spine   © 7231-3295 [employer]. [address] [place] [address], [place] [address]. All rights reserved. This information is not intended as a substitute for professional medical care. Always follow your healthcare professional's instructions.            Understanding Sciatica  Sciatica is leg pain often due to pressure on                         a nerve in your low back that connects to the sciatic nerve. This pressure may be caused by a damaged disk or by abnormal bone growth. You may feel pain, burning, tingling, or numbness  that shoots down your leg.   Pressure from a Damaged Disk  Constant wear and tear on a disk can cause it to weaken. Part of the disk                 may push outward (herniate). Part of the disk may then press on nearby nerves. If this nerve leads to the sciatic nerve, you may feel pain down your leg.   Pressure from Bone  A disk can wear out and thin with age. This can cause the vertebrae above and below the disk to touch, putting pressure on a nerve. Abnormal bone growths called bone spurs can also form where the bones rub together. These can narrow the spinal canal and press on nerves.     © 0187-1136 [employer]. [address] [place] [address], [place] [address]. All rights reserved. This information is not intended as a substitute for professional medical care. Always follow your healthcare professional's instructions.               Smoking Cessation     If you would like to quit smoking:   You may be eligible for free services if you are a [place] resident and started smoking cigarettes before September 1, 1988.  Call the Smoking Cessation Trust ([facility]) toll free at ([phone]) 302-6560 or [phone]) 672-4412.   Call 1-800-QUIT-NOW if you do not meet the above criteria.   Contact us via email: [email or web address]   View our website for more information: [email or web address]        Language Assistance Services     ATTENTION: Language assistance services are available, free of charge. Please call [phone].      ATENCIÓN: Si habla español, tiene a cano disposición servicios gratuitos de asistencia lingüística. Llame al [phone].     CHÚ Ý: N?u b?n nói Ti?ng Vi?t, có các d?ch v? h? tr? ngôn ng? mi?n phí dành cho b?n. G?i s? [phone].         [facility] complies with applicable Federal civil rights laws and does not discriminate on the basis of race, color, national origin, age, disability, or sex.
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
## 2017-05-10 ENCOUNTER — TELEPHONE (OUTPATIENT)
Dept: ALLERGY | Facility: CLINIC | Age: 37
End: 2017-05-10

## 2017-05-10 DIAGNOSIS — M54.31 SCIATICA, RIGHT SIDE: Primary | ICD-10-CM

## 2017-05-10 NOTE — TELEPHONE ENCOUNTER
----- Message from Cleopatra Marx sent at 5/10/2017  9:32 AM CDT -----  Contact: Self/[phone]  Patient would like to get a referral.  Does the patient already have the specialty clinic appointment scheduled:  No  If yes, what date is the appointment scheduled:   Does not have an appointment  Referral to what specialty:  Pain Management   Reason (be specific):  Pain on his right side - hip through his toe  Does the patient want the referral with a specific physician:  no  [facility]  Comments: [place] [facility]. [phone]. Please call and advise.          Thank you

## 2017-05-24 ENCOUNTER — NURSE TRIAGE (OUTPATIENT)
Dept: ADMINISTRATIVE | Facility: CLINIC | Age: 37
End: 2017-05-24

## 2017-05-24 NOTE — TELEPHONE ENCOUNTER
Has been seen at main campus for his back and all his records are there. Has had a MRI and a CT scan. Has DJD and a lumbar fracture. Due for surgery in June. Was sent hoome and advised if any numbness or tingling go to ED or if having any blacking out. Pt is being stubborn. Pt black out in the kitchen. Pt was on floor crawling on floor to room and started dry heaving and complaining of excrutiating pain from back to leg.pt refusing to seek medical attention at this time. While triaging caller advised to ask pt a question.Caller states pt not responding appropriately. Caller advised to hang up and call EMS at this time.

## 2017-05-24 NOTE — TELEPHONE ENCOUNTER
Reason for Disposition   Difficult to awaken or acting confused  (e.g., disoriented, slurred speech)    Protocols used: ST [id number]

## 2017-06-02 ENCOUNTER — TELEPHONE (OUTPATIENT)
Dept: NEUROSURGERY | Facility: CLINIC | Age: 37
End: 2017-06-02

## 2017-06-02 NOTE — TELEPHONE ENCOUNTER
Advised patient due to change in schedule, his appointment with Fiona Longoria PA-C on Tuesday 6/6/17 is now at 3:00pm. Understanding verbalized.

## 2017-09-03 ENCOUNTER — HOSPITAL ENCOUNTER (EMERGENCY)
Facility: OTHER | Age: 37
Discharge: HOME | End: 2017-09-03
Attending: EMERGENCY MEDICINE
Payer: MEDICAID

## 2017-09-03 VITALS
RESPIRATION RATE: 18 BRPM | TEMPERATURE: 98 F | HEIGHT: 69 IN | DIASTOLIC BLOOD PRESSURE: 75 MMHG | SYSTOLIC BLOOD PRESSURE: 135 MMHG | OXYGEN SATURATION: 99 % | BODY MASS INDEX: 29.33 KG/M2 | HEART RATE: 85 BPM | WEIGHT: 198 LBS

## 2017-09-03 DIAGNOSIS — S82.891A CLOSED RIGHT ANKLE FRACTURE, INITIAL ENCOUNTER: Primary | ICD-10-CM

## 2017-09-03 DIAGNOSIS — M25.579 ANKLE PAIN: ICD-10-CM

## 2017-09-03 DIAGNOSIS — M25.579 PAIN, ANKLE: ICD-10-CM

## 2017-09-03 PROCEDURE — 25000003 PHARM REV CODE 250: Performed by: EMERGENCY MEDICINE

## 2017-09-03 PROCEDURE — 99283 EMERGENCY DEPT VISIT LOW MDM: CPT

## 2017-09-03 RX ORDER — HYDROCODONE BITARTRATE AND ACETAMINOPHEN 5; 325 MG/1; MG/1
1 TABLET ORAL
Status: COMPLETED | OUTPATIENT
Start: 2017-09-03 | End: 2017-09-03

## 2017-09-03 RX ORDER — NAPROXEN 500 MG/1
500 TABLET ORAL 2 TIMES DAILY WITH MEALS
Qty: 20 TABLET | Refills: 0 | Status: SHIPPED | OUTPATIENT
Start: 2017-09-03 | End: 2017-12-19 | Stop reason: DRUGHIGH

## 2017-09-03 RX ORDER — HYDROCODONE BITARTRATE AND ACETAMINOPHEN 5; 325 MG/1; MG/1
1 TABLET ORAL EVERY 8 HOURS PRN
Qty: 15 TABLET | Refills: 0 | Status: SHIPPED | OUTPATIENT
Start: 2017-09-03 | End: 2017-09-13

## 2017-09-03 RX ADMIN — HYDROCODONE BITARTRATE AND ACETAMINOPHEN 1 TABLET: 5; 325 TABLET ORAL at 06:09
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
## 2017-09-03 NOTE — ED PROVIDER NOTES
Encounter Date: 9/3/2017       History     Chief Complaint   Patient presents with    Ankle Pain     right ankle pain after steping out of truck      The history is provided by the patient.   Leg Pain    The incident occurred in the street. The injury mechanism was torsion. The incident occurred just prior to arrival. The pain is present in the right ankle. The quality of the pain is described as aching. The pain is at a severity of 7/10. The pain has been constant since onset. Associated symptoms include loss of motion. Pertinent negatives include no numbness, no loss of sensation and no tingling. He reports no foreign bodies present. The symptoms are aggravated by activity, bearing weight and palpation. He has tried nothing for the symptoms. The treatment provided no relief.     Review of patient's allergies indicates:   Allergen Reactions    Fish containing products Hives     Seafood, shrimp     Past Medical History:   Diagnosis Date    Ankle fracture, right     Chronic back pain      History reviewed. No pertinent surgical history.  Family History   Problem Relation Age of Onset    Schizophrenia Mother     Heart disease Father      CHF    Bipolar disorder Sister      Social History   Substance Use Topics    Smoking status: Current Every Day Smoker     Packs/day: 1.00     Types: Cigarettes    Smokeless tobacco: Never Used    Alcohol use No      Comment: remote heavy     Review of Systems   Constitutional: Negative for fever.   HENT: Negative for sore throat.    Respiratory: Negative for shortness of breath.    Cardiovascular: Negative for chest pain.   Gastrointestinal: Negative for nausea.   Genitourinary: Negative for dysuria.   Musculoskeletal: Positive for arthralgias, gait problem and joint swelling. Negative for back pain.   Skin: Negative for rash.   Neurological: Negative for tingling, weakness and numbness.   Hematological: Does not bruise/bleed easily.   All other systems reviewed and are  negative.      Physical Exam     Initial Vitals   BP Pulse Resp Temp SpO2   09/03/17 1652 09/03/17 1651 09/03/17 1651 09/03/17 1651 09/03/17 1651   135/75 85 18 98 °F (36.7 °C) 99 %      MAP       09/03/17 1652       95         Physical Exam    Nursing note and vitals reviewed.  Constitutional: He appears well-developed and well-nourished. He is not diaphoretic. No distress.   HENT:   Head: Normocephalic and atraumatic.   Right Ear: External ear normal.   Left Ear: External ear normal.   Nose: Nose normal.   Mouth/Throat: Oropharynx is clear and moist.   Eyes: EOM are normal. Pupils are equal, round, and reactive to light.   Neck: Normal range of motion. Neck supple.   Cardiovascular: Normal rate, regular rhythm, normal heart sounds and intact distal pulses. Exam reveals no gallop and no friction rub.    No murmur heard.  Pulmonary/Chest: Breath sounds normal. No stridor. No respiratory distress. He has no wheezes. He has no rhonchi. He has no rales. He exhibits no tenderness.   Abdominal: Soft. Bowel sounds are normal. He exhibits no distension and no mass. There is no tenderness. There is no rebound and no guarding.   Musculoskeletal: Normal range of motion. He exhibits edema and tenderness.        Right hip: Normal.        Left hip: Normal.        Right knee: Normal.        Left knee: Normal.        Right ankle: He exhibits swelling. Tenderness.        Left ankle: Normal.        Feet:    Neurological: He is alert. He has normal strength and normal reflexes. No cranial nerve deficit.   Skin: Skin is warm and dry. Capillary refill takes less than 2 seconds.   Psychiatric: He has a normal mood and affect.         ED Course   Procedures  Labs Reviewed - No data to display     Imaging Results          X-Ray Ankle Complete Right (Final result)  Result time 09/03/17 17:14:45    Final result by Isac Sutherland MD (09/03/17 17:14:45)                 Impression:        1. Medial malleolus chronic appearing deformity with  potential nonunion. Correlate clinically and for point tenderness at this region.    2. Lateral midfoot 9 mm linear metallic appearing retained foreign body.      Electronically signed by: DOLORES SUTHERLAND MD, MD  Date:     09/03/17  Time:    17:14              Narrative:    COMPARISON: None    FINDINGS: 3 views right ankle; 3 views right tibia.        Bones are well mineralized. There is deformity with well-corticated appearing radiolucency at the medial malleolus without significant overlying soft tissue swelling, suggesting sequela of remote trauma with potential nonunion.      Remainder of the tibia appears intact. Otherwise, no dislocation or destructive osseous process identified.  The joint spaces appear relatively maintained.  Enthesophyte at the Achilles insertion site. No subcutaneous emphysema.     There is a 9 mm linear radiodensity projected within the soft tissues adjacent to the lateral aspect of the cuboid bone suggestive of a retained foreign body.                             X-Ray Tibia Fibula 2 View Right (Final result)  Result time 09/03/17 17:14:44    Final result by Dolores Sutherland MD (09/03/17 17:14:44)                 Impression:        1. Medial malleolus chronic appearing deformity with potential nonunion. Correlate clinically and for point tenderness at this region.    2. Lateral midfoot 9 mm linear metallic appearing retained foreign body.      Electronically signed by: DOLORES SUTHERLAND MD, MD  Date:     09/03/17  Time:    17:14              Narrative:    COMPARISON: None    FINDINGS: 3 views right ankle; 3 views right tibia.        Bones are well mineralized. There is deformity with well-corticated appearing radiolucency at the medial malleolus without significant overlying soft tissue swelling, suggesting sequela of remote trauma with potential nonunion.      Remainder of the tibia appears intact. Otherwise, no dislocation or destructive osseous process identified.  The joint spaces appear  relatively maintained.  Enthesophyte at the Achilles insertion site. No subcutaneous emphysema.     There is a 9 mm linear radiodensity projected within the soft tissues adjacent to the lateral aspect of the cuboid bone suggestive of a retained foreign body.                                                   ED Course      Medical decision making   Chief complaint: Right ankle pain  Differential diagnosis: Strain, sprain, contusion, and fracture  Treatment in the ED Physical Exam, Norco for pain, walking boot, and crutches  Patient reports feeling better after meds and splint.    Discussed need to follow-up with orthopedic physician, and imaging results.    Fill and take prescriptions for naproxen, Norco as directed.  Return to the ED if symptoms worsen or do not resolve.   Answered questions and discussed discharge plan.    Patient feels much better and is ready for discharge.  Follow up with PCP in 1 days.    Clinical Impression:   The primary encounter diagnosis was Closed right ankle fracture, initial encounter. Diagnoses of Ankle pain and Pain, ankle were also pertinent to this visit.                           Maya Steven DO  09/03/17 1914

## 2017-09-03 NOTE — ED NOTES
"When told his ride would have to enter ED pt stated explicit ta " God //// you people" / pt informed it's my license and he would be released with ride  "

## 2017-09-06 ENCOUNTER — TELEPHONE (OUTPATIENT)
Dept: INTERNAL MEDICINE | Facility: CLINIC | Age: 37
End: 2017-09-06

## 2017-09-06 NOTE — TELEPHONE ENCOUNTER
----- Message from Leonel Hall sent at 9/5/2017 11:08 AM CDT -----  Contact: self [phone]  Sooner appointment than the  can schedule.  Did you offer to schedule the next available appointment and put the patient on the wait list?:  yes  When is the first available appointment: 09/27  What is the nature of the appointment: right broken ankel   What visit type: uc  Patient preference of timeframe to be scheduled:  Afternoon   Comments:

## 2017-09-06 NOTE — TELEPHONE ENCOUNTER
Pt called in upset because he want an earlier appt than what can be scheduled. I tried accommodating him with an urgent slot on 09/25/17 and he refused.

## 2017-09-18 ENCOUNTER — HOSPITAL ENCOUNTER (EMERGENCY)
Facility: OTHER | Age: 37
Discharge: HOME | End: 2017-09-18
Attending: EMERGENCY MEDICINE
Payer: MEDICAID

## 2017-09-18 VITALS
RESPIRATION RATE: 16 BRPM | WEIGHT: 198 LBS | TEMPERATURE: 98 F | OXYGEN SATURATION: 100 % | HEIGHT: 69 IN | DIASTOLIC BLOOD PRESSURE: 76 MMHG | HEART RATE: 76 BPM | BODY MASS INDEX: 29.33 KG/M2 | SYSTOLIC BLOOD PRESSURE: 134 MMHG

## 2017-09-18 DIAGNOSIS — S63.501A WRIST SPRAIN, RIGHT, INITIAL ENCOUNTER: Primary | ICD-10-CM

## 2017-09-18 PROCEDURE — 99283 EMERGENCY DEPT VISIT LOW MDM: CPT | Mod: 25

## 2017-09-18 PROCEDURE — 25000003 PHARM REV CODE 250: Performed by: EMERGENCY MEDICINE

## 2017-09-18 PROCEDURE — 29125 APPL SHORT ARM SPLINT STATIC: CPT | Mod: RT

## 2017-09-18 RX ORDER — IBUPROFEN 600 MG/1
600 TABLET ORAL EVERY 8 HOURS PRN
Qty: 15 TABLET | Refills: 0 | Status: SHIPPED | OUTPATIENT
Start: 2017-09-18 | End: 2017-10-04 | Stop reason: ALTCHOICE

## 2017-09-18 RX ORDER — IBUPROFEN 600 MG/1
600 TABLET ORAL
Status: COMPLETED | OUTPATIENT
Start: 2017-09-18 | End: 2017-09-18

## 2017-09-18 RX ADMIN — IBUPROFEN 600 MG: 600 TABLET, FILM COATED ORAL at 07:09

## 2017-09-20 NOTE — ED PROVIDER NOTES
Encounter Date: 9/18/2017       History     Chief Complaint   Patient presents with    Wrist Pain     pt c/o R wrist pain day s/p trailor falling on wrist x 1 day ago     Mr Vasquez reports diffuse R wrist pain since having a heavy object fall on wrist at work yesterday and it twisted his wrist. Reports pain is constant and kept him up all night. No home meds taken. Denies hx of wrist trauma in the past. Denies numbness, tingling or weakness or skin abnl. Reports pain is worse w ranging wrist. No pain in hands, fingers or elbow      The history is provided by the patient.     Review of patient's allergies indicates:   Allergen Reactions    Fish containing products Hives     Seafood, shrimp    Iodine and iodide containing products      Past Medical History:   Diagnosis Date    Ankle fracture, right     Chronic back pain      History reviewed. No pertinent surgical history.  Family History   Problem Relation Age of Onset    Schizophrenia Mother     Heart disease Father      CHF    Bipolar disorder Sister      Social History   Substance Use Topics    Smoking status: Current Every Day Smoker     Packs/day: 1.00     Types: Cigarettes    Smokeless tobacco: Never Used    Alcohol use No      Comment: remote heavy     Review of Systems   Musculoskeletal:        Positive R wrist pain diffusely.    All other systems reviewed and are negative.      Physical Exam     Initial Vitals [09/18/17 0640]   BP Pulse Resp Temp SpO2   136/77 77 18 97.7 °F (36.5 °C) 96 %      MAP       96.67         Physical Exam    Nursing note and vitals reviewed.  Constitutional: He appears well-developed and well-nourished. He is not diaphoretic. No distress.   HENT:   Head: Normocephalic and atraumatic.   Pulmonary/Chest: Breath sounds normal. No respiratory distress.   Musculoskeletal:   Pain with full passive rom of wrist. Normal 2+radial pulse. No swelling or deformity of hand, wrist or forearm. ttp diffusely. No pain worsening with axial  load pressure on thumb. No snuff box ttp.   Neurological: He is alert and oriented to person, place, and time.   Skin: Skin is warm and dry. Capillary refill takes less than 2 seconds. No rash noted. No erythema.   Psychiatric: He has a normal mood and affect. His behavior is normal. Thought content normal.         ED Course   Procedures  Labs Reviewed - No data to display          Medical Decision Making:   Clinical Tests:   Radiological Study: Ordered and Reviewed  ED Management:  Xray negative for fx. Discussed that pt may need repeat xrays if problem/pain continues in a few days. Will provide wrist splin. Discussed RICE. He iss table for d/c. quesitons answered. There is no indication for further emergent intervention or evaluation at this time.           Imaging Results          X-Ray Wrist Complete Right (Final result)  Result time 09/18/17 06:45:37    Final result by Hina Ordonez MD (09/18/17 06:45:37)                 Impression:      No acute fracture of the right wrist.      Electronically signed by: HINA ORDONEZ  Date:     09/18/17  Time:    06:45              Narrative:    Comparison: None available    Technique: PA, lateral and oblique radiographs of the right wrist    Findings: There is no evidence of acute fracture or dislocation. Carpal alignment appears within normal limits. No focal soft tissue swelling is appreciated.                                          ED Course      Clinical Impression:   The encounter diagnosis was Wrist sprain, right, initial encounter.                           Akil Tyler MD  09/20/17 0629

## 2017-10-04 ENCOUNTER — OFFICE VISIT (OUTPATIENT)
Dept: NEUROSURGERY | Facility: CLINIC | Age: 37
End: 2017-10-04
Payer: MEDICAID

## 2017-10-04 VITALS
SYSTOLIC BLOOD PRESSURE: 124 MMHG | BODY MASS INDEX: 28.97 KG/M2 | DIASTOLIC BLOOD PRESSURE: 81 MMHG | HEART RATE: 90 BPM | TEMPERATURE: 98 F | WEIGHT: 196.19 LBS

## 2017-10-04 DIAGNOSIS — R26.81 GAIT INSTABILITY: Primary | ICD-10-CM

## 2017-10-04 DIAGNOSIS — M54.50 LOW BACK PAIN OF OVER 3 MONTHS DURATION: ICD-10-CM

## 2017-10-04 DIAGNOSIS — M48.061 SPINAL STENOSIS AT L4-L5 LEVEL: ICD-10-CM

## 2017-10-04 PROCEDURE — 99204 OFFICE O/P NEW MOD 45 MIN: CPT | Mod: S$PBB,,, | Performed by: PHYSICIAN ASSISTANT

## 2017-10-04 PROCEDURE — 99213 OFFICE O/P EST LOW 20 MIN: CPT | Mod: PBBFAC | Performed by: PHYSICIAN ASSISTANT

## 2017-10-04 PROCEDURE — 99999 PR PBB SHADOW E&M-EST. PATIENT-LVL III: CPT | Mod: PBBFAC,,, | Performed by: PHYSICIAN ASSISTANT

## 2017-10-04 NOTE — LETTER
October 7, 2017      Fred Resendiz MD  [address]  [facility] [address]           [facility] - Neurosurgery 7th Fl  [address]  [facility] [address]  Phone: [phone]          Patient: Abner Vasquez   MR Number: [id number]   YOB: 1980   Date of Visit: 10/4/2017       Dear Dr. Fred Resendiz:    Thank you for referring Abner Vasquez to me for evaluation. Attached you will find relevant portions of my assessment and plan of care.    If you have questions, please do not hesitate to call me. I look forward to following Abner Vasquez along with you.    Sincerely,    Fiona Longoria PA-C    Enclosure  CC:  No Recipients    If you would like to receive this communication electronically, please contact [email or web address] or [phone] to request more information on [employer] Link access.    For providers and/or their staff who would like to refer a patient to [facility], please contact us through our one-stop-shop provider referral line, Rex Kovacs, at [phone].    If you feel you have received this communication in error or would no longer like to receive these types of communications, please e-mail [email or web address]

## 2017-10-07 PROBLEM — R26.81 GAIT INSTABILITY: Status: ACTIVE | Noted: 2017-10-07

## 2017-10-07 PROBLEM — M48.061 SPINAL STENOSIS AT L4-L5 LEVEL: Status: ACTIVE | Noted: 2017-10-07

## 2017-10-07 PROBLEM — M54.50 LOW BACK PAIN OF OVER 3 MONTHS DURATION: Status: ACTIVE | Noted: 2017-10-07

## 2017-10-08 NOTE — PROGRESS NOTES
[facility] 7th Fl  Neurosurgery    SUBJECTIVE:     History of Present Illness:  Abner Vasquez is a 37 y.o. male with chronic low back pain who presents to clinic for worsening low back pain with associated BLE numbness. He was seen in the ED in May 2017 for low back pain where an MRI lumbar spine was obtained. The MRI showed DDD at L4-5 and L5-S1, severe spinal stenosis at L4-5 and mod-severe stenosis at L5-S1. He was referred to neurosurgery at that time, but did not follow-up as he felt his symptoms were improving. However, over the past 3 weeks, he says the LBP has become bilateral, and he reports the numbness as a new symptom extending laterally from his back to his feet bilaterally. He also endorses numbness of the BUE from the hand to elbow , in addition to numbness all around his leg from his foot to knee bilaterally. He does not associate the symptoms with any particular activity, movementl or position. The pain is improved with rocking in a seated position. He denies b/b incontinence and difficulty buttoning his shirt. He reports dropping items at work.       Review of patient's allergies indicates:   Allergen Reactions    Fish containing products Hives     Seafood, shrimp    Iodine and iodide containing products     Naproxen sodium Swelling and Rash       Past Medical History:   Diagnosis Date    Ankle fracture, right     Chronic back pain        History reviewed. No pertinent surgical history.     Family History   Problem Relation Age of Onset    Schizophrenia Mother     Heart disease Father      CHF    Bipolar disorder Sister        Social History     Social History    Marital status: Single     Spouse name: N/A    Number of children: N/A    Years of education: N/A     Occupational History    Not on file.     Social History Main Topics    Smoking status: Current Every Day Smoker     Packs/day: 1.00     Types: Cigarettes    Smokeless tobacco: Current User    Alcohol  use No      Comment: remote heavy    Drug use: No    Sexual activity: Yes     Partners: Female     Other Topics Concern    Not on file     Social History Narrative    Staying with brother in law. 2 kids, 5 mos, 6, (all healthy). .       Review of Systems:  Constitutional: no fever, chills or night sweats. No changes in weight   Eyes: no visual changes   ENT: no nasal congestion or sore throat   Respiratory: no cough or shortness of breath   Cardiovascular: no chest pain or palpitations   Gastrointestinal: no nausea or vomiting   Genitourinary: no hematuria or dysuria   Integument/Breast: no rash or pruritis   Hematologic/Lymphatic: no easy bruising or lymphadenopathy   Musculoskeletal: back pain as noted in HPI  Neurological: no seizures or tremors   Behavioral/Psych: no auditory or visual hallucinations   Endocrine: no heat or cold intolerance     OBJECTIVE:     Vital Signs (Most Recent):  Vitals:    10/04/17 1415   BP: 124/81   Pulse: 90   Temp: 97.6 °F (36.4 °C)   TempSrc: Oral   Weight: 89 kg (196 lb 3.2 oz)       Physical Exam:  General: well developed, well nourished, no distress.   Head: normocephalic, atraumatic  Neurologic: Alert and oriented. Thought content appropriate.  GCS: Motor: 6/Verbal: 5/Eyes: 4 GCS Total: 15  Mental Status: Awake, Alert, Oriented x 4  Language: No aphasia  Speech: No dysarthria  Cranial nerves: face symmetric, tongue midline, CN II-XII grossly intact.   Eyes: pupils equal, round, reactive to light with accomodation, EOMI.  Pulmonary: normal respirations, no signs of respiratory distress  Abdomen: soft, non-distended, not tender to palpation  Sensory: intact to light touch throughout    Motor Strength: Moves all extremities spontaneously with good tone. No abnormal movements seen.     Strength  Deltoids Triceps Biceps Wrist Extension Wrist Flexion Hand    Upper: R 5/5 5/5 5/5 5/5 5/5 5/5    L 5/5 5/5 5/5 5/5 5/5 5/5     Iliopsoas Quadriceps Knee  Flexion  Tibialis  anterior Gastro- cnemius EHL   Lower: R 5/5 5/5 5/5 5/5 5/5 4/5    L 5/5 5/5 5/5 5/5 5/5 5/5     DTR's: 2 + and symmetric in UE and LE  Pronator Drift: no drift noted  Finger-to-nose: Intact bilaterally  Guerrier: absent  Clonus: present on left <3 beats  Babinski: absent  Pulses: 2+ and symmetric radial and dorsalis pedis.  Skin: Skin is warm, dry and intact.  Gait: normal  Tandem Gait: slight difficulty           Cervical ROM: full  Lumbar ROM: full   SI Joint tenderness: positive bilaterally Pain on Hip ROM: Negative    Diagnostic Results:  I personally reviewed imaging and agree with findings.     MRI lumbar spine (May 2017)  Marked disc degenerative disease at L4-L5 and L5-S1 with severe spinal canal stenosis at L4-L5 and moderate to severe spinal canal stenosis at L5-S1, please see above for details.    Electronically signed by: BEA MCCLELLAN MD  Date: 05/02/17  Time: 21:30     ASSESSMENT/PLAN:     Abner Vasquez is a 37 y.o. male with chronic low back pain and new onset of subjective numbness in all extremities.     -MRI lumbar spine from May 2017 showed spinal stenosis and DDD at L4-5 and L5-S1  -New symptoms include dropping items, subjective numbness and gait disturbance  -Will order MRI cervical spine to rule-out myelopathy.   -RTC in 2-3 weeks with MRI to discuss results.       Fiona Longoria PA-C  Neurosurgery

## 2017-12-11 ENCOUNTER — HOSPITAL ENCOUNTER (OUTPATIENT)
Dept: RADIOLOGY | Facility: HOSPITAL | Age: 37
Discharge: HOME | End: 2017-12-11
Attending: PHYSICIAN ASSISTANT
Payer: MEDICAID

## 2017-12-11 DIAGNOSIS — R26.81 GAIT INSTABILITY: ICD-10-CM

## 2017-12-11 PROCEDURE — 72141 MRI NECK SPINE W/O DYE: CPT | Mod: TC

## 2017-12-11 PROCEDURE — 72141 MRI NECK SPINE W/O DYE: CPT | Mod: 26,,, | Performed by: RADIOLOGY

## 2017-12-19 ENCOUNTER — OFFICE VISIT (OUTPATIENT)
Dept: NEUROSURGERY | Facility: CLINIC | Age: 37
End: 2017-12-19
Payer: MEDICAID

## 2017-12-19 VITALS
TEMPERATURE: 98 F | DIASTOLIC BLOOD PRESSURE: 79 MMHG | HEIGHT: 69 IN | SYSTOLIC BLOOD PRESSURE: 127 MMHG | RESPIRATION RATE: 18 BRPM | BODY MASS INDEX: 28.88 KG/M2 | HEART RATE: 69 BPM | WEIGHT: 195 LBS

## 2017-12-19 DIAGNOSIS — M48.061 SPINAL STENOSIS AT L4-L5 LEVEL: Primary | ICD-10-CM

## 2017-12-19 DIAGNOSIS — M54.50 LOW BACK PAIN OF OVER 3 MONTHS DURATION: ICD-10-CM

## 2017-12-19 DIAGNOSIS — M47.26 OSTEOARTHRITIS OF SPINE WITH RADICULOPATHY, LUMBAR REGION: ICD-10-CM

## 2017-12-19 DIAGNOSIS — M51.26 DISC DISPLACEMENT, LUMBAR: ICD-10-CM

## 2017-12-19 DIAGNOSIS — M50.20 DISC DISPLACEMENT, CERVICAL: ICD-10-CM

## 2017-12-19 DIAGNOSIS — M62.838 MUSCLE SPASM OF RIGHT LOWER EXTREMITY: ICD-10-CM

## 2017-12-19 DIAGNOSIS — M51.37 DDD (DEGENERATIVE DISC DISEASE), LUMBOSACRAL: ICD-10-CM

## 2017-12-19 DIAGNOSIS — M47.22 CERVICAL RADICULOPATHY DUE TO OSTEOARTHRITIS OF SPINE: ICD-10-CM

## 2017-12-19 DIAGNOSIS — M50.30 DDD (DEGENERATIVE DISC DISEASE), CERVICAL: ICD-10-CM

## 2017-12-19 PROCEDURE — 99214 OFFICE O/P EST MOD 30 MIN: CPT | Mod: S$PBB,,, | Performed by: PHYSICIAN ASSISTANT

## 2017-12-19 PROCEDURE — 99213 OFFICE O/P EST LOW 20 MIN: CPT | Mod: PBBFAC | Performed by: PHYSICIAN ASSISTANT

## 2017-12-19 PROCEDURE — 99999 PR PBB SHADOW E&M-EST. PATIENT-LVL III: CPT | Mod: PBBFAC,,, | Performed by: PHYSICIAN ASSISTANT

## 2017-12-19 RX ORDER — NAPROXEN 500 MG/1
500 TABLET ORAL 2 TIMES DAILY
Qty: 28 TABLET | Refills: 1 | Status: SHIPPED | OUTPATIENT
Start: 2017-12-19 | End: 2018-01-02

## 2017-12-19 RX ORDER — METHYLPREDNISOLONE 4 MG/1
TABLET ORAL
Qty: 1 PACKAGE | Refills: 0 | Status: SHIPPED | OUTPATIENT
Start: 2017-12-19 | End: 2018-01-09

## 2017-12-19 RX ORDER — CYCLOBENZAPRINE HCL 5 MG
5 TABLET ORAL NIGHTLY PRN
Qty: 60 TABLET | Refills: 0 | Status: SHIPPED | OUTPATIENT
Start: 2017-12-19 | End: 2017-12-29

## 2017-12-19 NOTE — PROGRESS NOTES
"Established Pateint    SUBJECTIVE:     History of Present Illness:  Abner Vasquez is 37 y.o. male who last saw Fiona Longoria PA-C in clinic on 12/20/17 and stated "LBP has become bilateral, and he reports the numbness as a new symptom extending laterally from his back to his feet bilaterally. He also endorses numbness of the BUE from the hand to elbow , in addition to numbness all around his leg from his foot to knee bilaterally. He does not associate the symptoms with any particular activity, movementl or position. The pain is improved with rocking in a seated position. He denies b/b incontinence and difficulty buttoning his shirt. He reports dropping items at work." at that time, she ordered MRI C spine to rule out myelopathy. He presents today to review his MRI C spine.    On exam, he mostly complains of back and leg pain. Back = leg pain. It started last year but progressively worsened May 2017 and was seen at [facility] ED. At that time, MRI showed DDD at L4-5 and L5-S1, severe spinal stenosis at L4-5 and mod-severe stenosis at L5-S1. Neurosurgery was consulted and recommended medrol dose pack and clinic follow up. Medrol dose pack temporarily improved pain. Pain started at right side low back and radiate down lateral right leg ending above right ankle. It is constantly shooting and is associated with numbness and tingling. At night, he has severe leg cramps that keeps him up. He is taking OTC aleve with no improvements. LBP will improve if he is in a seated position and rocks back/forth. He is unable to sit/stand for too long as it will provoke his pain. Of note, he does have some RUE paresthesias very seldomly where he has tingling from R shoulder down to hand. It occurs in entire RUE. He is mostly concerned about treatment and management for his LBP at this time. Denies any gait issues, b/b incontinence, dropping items, neck pain, or hand weakness.         Review of patient's allergies indicates: "   Allergen Reactions    Fish containing products Hives     Seafood, shrimp    Iodine and iodide containing products     Naproxen sodium Swelling and Rash       Current Outpatient Prescriptions   Medication Sig Dispense Refill    cyclobenzaprine (FLEXERIL) 10 MG tablet Take 10 mg by mouth 3 (three) times daily as needed for Muscle spasms.      naproxen (NAPROSYN) 500 MG tablet Take 1 tablet (500 mg total) by mouth 2 (two) times daily with meals. 20 tablet 0    predniSONE (DELTASONE) 20 MG tablet Take 20 mg by mouth once daily.       No current facility-administered medications for this visit.        Past Medical History:   Diagnosis Date    Ankle fracture, right     Chronic back pain      History reviewed. No pertinent surgical history.  Family History     Problem Relation (Age of Onset)    Bipolar disorder Sister    Heart disease Father    Schizophrenia Mother        Social History     Social History    Marital status: Single     Spouse name: N/A    Number of children: N/A    Years of education: N/A     Social History Main Topics    Smoking status: Current Every Day Smoker     Packs/day: 1.00     Types: Cigarettes    Smokeless tobacco: Current User    Alcohol use No      Comment: remote heavy    Drug use: No    Sexual activity: Yes     Partners: Female     Other Topics Concern    None     Social History Narrative    Staying with brother in law. 2 kids, 5 mos, 6, (all healthy). .       Review of Systems:  Review of Systems   Constitutional: no fever, chills or night sweats. No changes in weight   Eyes: no visual changes   ENT: no nasal congestion or sore throat   Respiratory: no cough or shortness of breath   Cardiovascular: no chest pain or palpitations   Gastrointestinal: no nausea or vomiting   Genitourinary: no hematuria or dysuria   Integument/Breast: no rash or pruritis   Hematologic/Lymphatic: no easy bruising or lymphadenopathy   Musculoskeletal: no arthralgias or myalgias. Positive  "for low back pain.   Neurological: no seizures or tremors. No weakness. Positive for RUE and RLE paresthesia.   Behavioral/Psych: no auditory or visual hallucinations   Endocrine: no heat or cold intolerance       OBJECTIVE:     Vital Signs  Temp: 97.7 °F (36.5 °C)  Pulse: 69  Resp: 18  BP: 127/79  Pain Score: 10-Worst pain ever (lumbar)  Height: 5' 9" (175.3 cm)  Weight: 88.5 kg (195 lb)  Body mass index is 28.8 kg/m².    Physical Exam:  Neurosurgery Physical Exam  General: well developed, well nourished, no distress.   Neurologic: Awake, alert and oriented x3. Thought content appropriate.  GCS: Motor: 6/Verbal: 5/Eyes: 4 GCS Total: 15  Cranial nerves: II-XII grossly intact. PERRLA. Face symmetric, tongue midline.   Language: no aphasia  Speech: no dysarthria   Neck: supple, without obvious masses    Sensory: intact to light touch B/L UE and LE  Motor Strength: Moves all extremities spontaneously with good tone. Full strength upper and lower extremities. No abnormal movements seen.    Strength  Deltoids Triceps Biceps Wrist Extension Wrist Flexion Hand    Upper: R 5/5 5/5 5/5 5/5 5/5 5/5    L 5/5 5/5 5/5 5/5 5/5 5/5     Iliopsoas Quadriceps Knee  Flexion Tibialis  anterior Gastro- cnemius EHL   Lower: R 5/5 5/5 5/5 5/5 5/5 5/5    L 5/5 5/5 5/5 5/5 5/5 5/5     Interossi muscle strength- intact    DTR's - 2 + and symmetric in UE and LE  Guerrier's - Negative        Lhermitte's - Negative  Spurlings- Negative           Ankle Clonus - Negative           Babinski  - Negative     SLR - Negative   Gait - normal      Tandem Gait - No difficulty    Able to walk/stand on heels & toes  Cervical ROM - full  Lumbar ROM - full  No focal numbness or weakness  Lower lumbar midline and paraspinal tenderness to palpation  No difficulty transitioning from seated to standing position or vice versa.  ENT: normal hearing with finger rub  Heart: RRR, no cyanosis, pallor, or edema.   Lungs- normal respiratory effort  Abdomen-  soft, " symmetric and nontender  Skin: grossly intact in all 4 extremities without obvious rashes or lesions  Extremities: warm with no cyanosis or edema, or clubbing  Pulses: palpable distal pulses    SI Joint tenderness - Negative  Greater Trochanter Joint tenderness - Negative  Pain on Hip ROM - Negative  Tinel's - Negative Phalen's - Negative    Chronic R ankle pain with ROM d/t previous injury     Diagnostic Results:  MRI L spine 5/2017 personally reviewed. Imaging with poor quality secondary to motion artifact. Unable to accurately assess. Straightening of lumbar spine with generalized spondylosis, facet arthrosis, and mild DDD. L4-5 DB and FH causing mod central stenosis. L5-S1 left paracentral DB and FH causing moderate L NFS.     MRI C spine 5/2017 personally reviewed.  Slight straightening of C spine. Mild DDD in upper cervical levels. No central stenosis. Mild DB at C3-4, C4-5, C6-7 causing mild R NF stenosis. Worst at C6-7 level.     ASSESSMENT/PLAN:     37 y.o. male with low back pain over year and recently worsened in May. He is full strength on exam with no abnormal reflexes and no SLR. Pain mostly in R L4-5 distribution. MRI findings as above. Will start with conservative therapy including Naprosyn course, Steroid dose pack, Flexeril qhs prn muscle spasms, and PT internal referral. RTC 3 months; if no improvements, consider repeat MRI L spine and CHELO.    All questions were answered. Patient was encouraged to call clinic with any future concerns prior to follow up appt. If any worsening symptoms, patient should report to ED.     Please call with any questions.    Colten Chauhan PA-C  Neurosurgery          Note dictated with voice recognition software, please excuse any grammatical errors.

## 2017-12-20 PROBLEM — M51.26 DISC DISPLACEMENT, LUMBAR: Status: ACTIVE | Noted: 2017-12-20

## 2017-12-20 PROBLEM — M50.20 DISC DISPLACEMENT, CERVICAL: Status: ACTIVE | Noted: 2017-12-20

## 2017-12-20 PROBLEM — M51.37 DDD (DEGENERATIVE DISC DISEASE), LUMBOSACRAL: Status: ACTIVE | Noted: 2017-12-20

## 2017-12-20 PROBLEM — M50.30 DDD (DEGENERATIVE DISC DISEASE), CERVICAL: Status: ACTIVE | Noted: 2017-12-20

## 2018-02-20 ENCOUNTER — CLINICAL SUPPORT (OUTPATIENT)
Dept: REHABILITATION | Facility: HOSPITAL | Age: 38
End: 2018-02-20
Payer: MEDICAID

## 2018-02-20 DIAGNOSIS — M54.50 CHRONIC RIGHT-SIDED LOW BACK PAIN WITHOUT SCIATICA: ICD-10-CM

## 2018-02-20 DIAGNOSIS — R29.3 POSTURAL IMBALANCE: ICD-10-CM

## 2018-02-20 DIAGNOSIS — M25.60 DECREASED RANGE OF MOTION: ICD-10-CM

## 2018-02-20 DIAGNOSIS — G89.29 CHRONIC RIGHT-SIDED LOW BACK PAIN WITHOUT SCIATICA: ICD-10-CM

## 2018-02-20 PROCEDURE — 97161 PT EVAL LOW COMPLEX 20 MIN: CPT | Mod: PN
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
## 2018-03-05 ENCOUNTER — HOSPITAL ENCOUNTER (EMERGENCY)
Facility: OTHER | Age: 38
Discharge: HOME | End: 2018-03-05
Attending: INTERNAL MEDICINE
Payer: MEDICAID

## 2018-03-05 VITALS
BODY MASS INDEX: 28.88 KG/M2 | HEIGHT: 69 IN | HEART RATE: 88 BPM | DIASTOLIC BLOOD PRESSURE: 82 MMHG | WEIGHT: 195 LBS | RESPIRATION RATE: 18 BRPM | TEMPERATURE: 97 F | OXYGEN SATURATION: 96 % | SYSTOLIC BLOOD PRESSURE: 126 MMHG

## 2018-03-05 DIAGNOSIS — M79.604 RIGHT LEG PAIN: Primary | ICD-10-CM

## 2018-03-05 PROCEDURE — 99283 EMERGENCY DEPT VISIT LOW MDM: CPT

## 2018-03-05 RX ORDER — METHOCARBAMOL 750 MG/1
1500 TABLET, FILM COATED ORAL 3 TIMES DAILY
Qty: 30 TABLET | Refills: 0 | Status: SHIPPED | OUTPATIENT
Start: 2018-03-05 | End: 2018-03-10

## 2018-03-05 NOTE — ED PROVIDER NOTES
Encounter Date: 3/5/2018       History     Chief Complaint   Patient presents with    Leg Pain     right leg pain from his ankle to his knee. Denies any injury. pain x 2 days     37-year-old male presents to the emergency department complaining of right leg pain ×3 days.  Denies fevers or chills.      The history is provided by the patient. No  was used.   Leg Pain    Incident location: No incident. There was no injury mechanism. The incident occurred several days ago. The pain is present in the right leg. The quality of the pain is described as aching. The pain is at a severity of 3/10. The pain has been constant since onset. He reports no foreign bodies present. The symptoms are aggravated by palpation and activity. He has tried nothing for the symptoms.     Review of patient's allergies indicates:   Allergen Reactions    Fish containing products Hives     Seafood, shrimp    Iodine and iodide containing products     Naproxen sodium Swelling and Rash     Past Medical History:   Diagnosis Date    Ankle fracture, right     Chronic back pain      History reviewed. No pertinent surgical history.  Family History   Problem Relation Age of Onset    Schizophrenia Mother     Heart disease Father      CHF    Bipolar disorder Sister      Social History   Substance Use Topics    Smoking status: Current Every Day Smoker     Packs/day: 1.00     Types: Cigarettes    Smokeless tobacco: Current User    Alcohol use No      Comment: remote heavy     Review of Systems   Musculoskeletal:        Leg pain   All other systems reviewed and are negative.      Physical Exam     Initial Vitals [03/05/18 0413]   BP Pulse Resp Temp SpO2   126/82 88 18 97.2 °F (36.2 °C) 96 %      MAP       96.67         Physical Exam    Nursing note and vitals reviewed.  Constitutional: He appears well-developed and well-nourished. No distress.   HENT:   Head: Normocephalic and atraumatic.   Right Ear: External ear normal.   Left  Ear: External ear normal.   Eyes: EOM are normal.   Neck: Normal range of motion.   Cardiovascular: Normal rate and regular rhythm.   Pulmonary/Chest: Breath sounds normal.   Abdominal: Soft. Bowel sounds are normal.   Musculoskeletal: He exhibits no edema.   Bilateral LE's without erythema; slight tenderness to palpation; NV intact; no gross deformities   Neurological: He is alert.   Skin: Skin is warm and dry.   Psychiatric: He has a normal mood and affect. Thought content normal.         ED Course   Procedures  Labs Reviewed - No data to display          Medical Decision Making:   Initial Assessment:   37-year-old male presents to the emergency department complaining of right leg pain ×3 days.  Denies fevers or chills.  ED Management:  Patient's physical exam revealed no cause for vascular imaging of the lower extremities.  Prescription for Robaxin was given and patient was advised to follow-up with his primary care physician tomorrow for reevaluation.                      Clinical Impression:   The encounter diagnosis was Right leg pain.    Disposition:   Disposition: Discharged  Condition: Stable                        Tyree Andino MD  03/14/18 0696

## 2018-05-10 ENCOUNTER — PATIENT MESSAGE (OUTPATIENT)
Dept: NEUROSURGERY | Facility: CLINIC | Age: 38
End: 2018-05-10

## 2018-05-28 ENCOUNTER — OFFICE VISIT (OUTPATIENT)
Dept: INTERNAL MEDICINE | Facility: CLINIC | Age: 38
End: 2018-05-28
Payer: MEDICAID

## 2018-05-28 VITALS
SYSTOLIC BLOOD PRESSURE: 110 MMHG | WEIGHT: 211.19 LBS | OXYGEN SATURATION: 96 % | DIASTOLIC BLOOD PRESSURE: 82 MMHG | HEIGHT: 69 IN | BODY MASS INDEX: 31.28 KG/M2 | HEART RATE: 91 BPM

## 2018-05-28 DIAGNOSIS — Z00.00 ROUTINE GENERAL MEDICAL EXAMINATION AT A HEALTH CARE FACILITY: Primary | ICD-10-CM

## 2018-05-28 DIAGNOSIS — F17.200 SMOKER: ICD-10-CM

## 2018-05-28 DIAGNOSIS — S82.54XK CLOSED NONDISPLACED FRACTURE OF MEDIAL MALLEOLUS OF RIGHT TIBIA WITH NONUNION, SUBSEQUENT ENCOUNTER: ICD-10-CM

## 2018-05-28 PROCEDURE — 99395 PREV VISIT EST AGE 18-39: CPT | Mod: S$PBB,,, | Performed by: INTERNAL MEDICINE

## 2018-05-28 PROCEDURE — 99999 PR PBB SHADOW E&M-EST. PATIENT-LVL IV: CPT | Mod: PBBFAC,,, | Performed by: INTERNAL MEDICINE

## 2018-05-28 PROCEDURE — 99214 OFFICE O/P EST MOD 30 MIN: CPT | Mod: PBBFAC | Performed by: INTERNAL MEDICINE

## 2018-05-28 RX ORDER — IBUPROFEN 200 MG
1 TABLET ORAL DAILY
Qty: 30 PATCH | Refills: 11 | Status: SHIPPED | OUTPATIENT
Start: 2018-05-28
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
## 2018-05-30 DIAGNOSIS — Z00.00 ROUTINE GENERAL MEDICAL EXAMINATION AT A HEALTH CARE FACILITY: Primary | ICD-10-CM

## 2018-05-31 ENCOUNTER — DOCUMENTATION ONLY (OUTPATIENT)
Dept: REHABILITATION | Facility: HOSPITAL | Age: 38
End: 2018-05-31

## 2018-05-31 DIAGNOSIS — G89.29 CHRONIC RIGHT-SIDED LOW BACK PAIN WITHOUT SCIATICA: ICD-10-CM

## 2018-05-31 DIAGNOSIS — M54.50 CHRONIC RIGHT-SIDED LOW BACK PAIN WITHOUT SCIATICA: ICD-10-CM

## 2018-05-31 DIAGNOSIS — R29.3 POSTURAL IMBALANCE: ICD-10-CM

## 2018-05-31 DIAGNOSIS — M25.60 DECREASED RANGE OF MOTION: ICD-10-CM

## 2018-05-31 NOTE — PROGRESS NOTES
REHAB [facility] OUTPATIENT DISCHARGE SUMMARY  Physical Therapy      Name:  Abner Vasquez  Date:  5/31/2018    Date of Evaluation:  2/20/18  Physician:  ROBEL Eduardo  Total # Of Visits:  1   Diagnosis:    Encounter Diagnoses   Name Primary?    Chronic right-sided low back pain without sciatica     Decreased range of motion     Postural imbalance          Physical/Functional Status:  Unable to assess pt's functional limitations and current impairments due to pt not returning to the clinic.     The patient is to be discharged from our Therapy service for the following reason(s):  Patient has not attended therapy since 2/20/18    Degree of Goal Achievement: Patient has not met goals secondary to:  Not returning to clinic for follow up assessment.    Patient Education: None provided    Discharge Plan:  D/C due to non-compliance for remaining visit.    Marisela Graves, PT  5/31/2018

## 2018-06-04 ENCOUNTER — OFFICE VISIT (OUTPATIENT)
Dept: NEUROSURGERY | Facility: CLINIC | Age: 38
End: 2018-06-04
Payer: MEDICAID

## 2018-06-04 VITALS
BODY MASS INDEX: 31.2 KG/M2 | HEART RATE: 77 BPM | TEMPERATURE: 98 F | SYSTOLIC BLOOD PRESSURE: 142 MMHG | DIASTOLIC BLOOD PRESSURE: 87 MMHG | WEIGHT: 211.31 LBS

## 2018-06-04 DIAGNOSIS — M47.26 OSTEOARTHRITIS OF SPINE WITH RADICULOPATHY, LUMBAR REGION: ICD-10-CM

## 2018-06-04 DIAGNOSIS — M48.061 SPINAL STENOSIS AT L4-L5 LEVEL: ICD-10-CM

## 2018-06-04 DIAGNOSIS — M51.37 DDD (DEGENERATIVE DISC DISEASE), LUMBOSACRAL: Primary | ICD-10-CM

## 2018-06-04 DIAGNOSIS — M51.26 DISC DISPLACEMENT, LUMBAR: ICD-10-CM

## 2018-06-04 DIAGNOSIS — Z71.6 ENCOUNTER FOR SMOKING CESSATION COUNSELING: ICD-10-CM

## 2018-06-04 PROCEDURE — 99999 PR PBB SHADOW E&M-EST. PATIENT-LVL III: CPT | Mod: PBBFAC,,, | Performed by: PHYSICIAN ASSISTANT

## 2018-06-04 PROCEDURE — 99214 OFFICE O/P EST MOD 30 MIN: CPT | Mod: S$PBB,,, | Performed by: PHYSICIAN ASSISTANT

## 2018-06-04 PROCEDURE — 99213 OFFICE O/P EST LOW 20 MIN: CPT | Mod: PBBFAC | Performed by: PHYSICIAN ASSISTANT

## 2018-06-04 RX ORDER — CYCLOBENZAPRINE HCL 5 MG
5 TABLET ORAL NIGHTLY PRN
Qty: 30 TABLET | Refills: 0 | OUTPATIENT
Start: 2018-06-04 | End: 2020-01-25

## 2018-06-04 NOTE — PROGRESS NOTES
Established Pateint    SUBJECTIVE:     History of Present Illness:  Abner Vasquez is 37 y.o. male who presents for neurosurgical follow up. He was last seen by Colten Chauhan PA-C, at which time his main complaints consisted of back and leg pain. Today, he states his leg pain has greatly diminished. He does, however, still report low back pain. He began physical therapy, but stopped due to insurance changes and requests a new referral. He denies weakness, paresthesias of the LE, or b/b incontinence. His activity is otherwise limited by a nonunion of his R ankle. He continues to smoke daily.      Review of patient's allergies indicates:   Allergen Reactions    Fish containing products Hives     Seafood, shrimp    Iodine and iodide containing products     Naproxen sodium Swelling and Rash       Current Outpatient Prescriptions   Medication Sig Dispense Refill    nicotine (NICODERM CQ) 14 mg/24 hr Place 1 patch onto the skin once daily. 30 patch 11     No current facility-administered medications for this visit.        Past Medical History:   Diagnosis Date    Ankle fracture, right     Chronic back pain      History reviewed. No pertinent surgical history.  Family History     Problem Relation (Age of Onset)    Bipolar disorder Sister    Heart disease Father    Schizophrenia Mother        Social History     Social History    Marital status: Single     Spouse name: N/A    Number of children: N/A    Years of education: N/A     Social History Main Topics    Smoking status: Current Every Day Smoker     Packs/day: 0.50     Types: Cigarettes    Smokeless tobacco: Current User    Alcohol use No      Comment: remote heavy    Drug use: No    Sexual activity: Yes     Partners: Female     Other Topics Concern    None     Social History Narrative    Staying with brother in law. 2 kids, 5 mos, 6, (all healthy). .       Review of Systems:  Review of Systems   Constitutional: no fever, chills or night  sweats. No changes in weight   Eyes: no visual changes   ENT: no nasal congestion or sore throat   Respiratory: no cough or shortness of breath   Cardiovascular: no chest pain or palpitations   Gastrointestinal: no nausea or vomiting   Genitourinary: no hematuria or dysuria   Integument/Breast: no rash or pruritis   Hematologic/Lymphatic: no easy bruising or lymphadenopathy   Musculoskeletal: no arthralgias or myalgias. Positive for low back pain.   Neurological: no seizures or tremors. No weakness. Positive for RUE and RLE paresthesia.   Behavioral/Psych: no auditory or visual hallucinations   Endocrine: no heat or cold intolerance       OBJECTIVE:     Vital Signs  Temp: 97.9 °F (36.6 °C)  Pulse: 77  BP: (!) 142/87  Pain Score:   8  Weight: 95.8 kg (211 lb 4.8 oz)  Body mass index is 31.2 kg/m².    Physical Exam:  Neurosurgery Physical Exam  General: well developed, well nourished, no distress.   Neurologic: Awake, alert and oriented x3. Thought content appropriate.  GCS: Motor: 6/Verbal: 5/Eyes: 4 GCS Total: 15  Cranial nerves: II-XII grossly intact. PERRLA. Face symmetric, tongue midline.   Language: no aphasia  Speech: no dysarthria   Neck: supple, without obvious masses    Sensory: intact to light touch B/L UE and LE  Motor Strength: Moves all extremities spontaneously with good tone. Full strength upper and lower extremities. No abnormal movements seen.    Strength  Deltoids Triceps Biceps Wrist Extension Wrist Flexion Hand    Upper: R 5/5 5/5 5/5 5/5 5/5 5/5    L 5/5 5/5 5/5 5/5 5/5 5/5     Iliopsoas Quadriceps Knee  Flexion Tibialis  anterior Gastro- cnemius EHL   Lower: R 5/5 5/5 5/5 5/5 5/5 5/5    L 5/5 5/5 5/5 5/5 5/5 5/5     Interossi muscle strength- intact    DTR's - 2 + and symmetric in UE and LE  Guerrier's - Negative        Lhermitte's - Negative  Spurlings- Negative           Ankle Clonus - Negative           Babinski  - Negative     SLR - Negative   Gait - normal      Tandem Gait - No  difficulty    Able to walk/stand on heels & toes  Lumbar ROM - full    Chronic R ankle pain with ROM d/t previous injury     Diagnostic Results:  MRI L spine 5/2017 personally reviewed. Imaging with poor quality secondary to motion artifact. Unable to accurately assess. Straightening of lumbar spine with generalized spondylosis, facet arthrosis, and mild DDD. L4-5 DB and FH causing mod central stenosis. L5-S1 left paracentral DB and FH causing moderate L NFS.     MRI C spine 5/2017 personally reviewed.  Slight straightening of C spine. Mild DDD in upper cervical levels. No central stenosis. Mild DB at C3-4, C4-5, C6-7 causing mild R NF stenosis. Worst at C6-7 level.     ASSESSMENT/PLAN:     37 y.o. male with lumbar spondylosis and chronic low back pain. I will place a new referral for physical therapy. He has notable paraspinal muscle spasm on exam. Rx given for flexeril to be taken at night. I have discussed smoking cessation with Mr. Vasuqez at length, and have explained the risk factors in continuing to smoke in the setting of his nonunion, upcoming surgery with orthopedics, and lumbar spondylosis. He voiced understanding.      Fiona Longoria PA-C  Neurosurgery  Pager: [phone]

## 2018-07-26 ENCOUNTER — HOSPITAL ENCOUNTER (EMERGENCY)
Facility: HOSPITAL | Age: 38
Discharge: HOME | End: 2018-07-26
Attending: EMERGENCY MEDICINE
Payer: MEDICAID

## 2018-07-26 VITALS
HEIGHT: 69 IN | DIASTOLIC BLOOD PRESSURE: 85 MMHG | OXYGEN SATURATION: 96 % | TEMPERATURE: 98 F | RESPIRATION RATE: 18 BRPM | BODY MASS INDEX: 29.62 KG/M2 | WEIGHT: 200 LBS | HEART RATE: 98 BPM | SYSTOLIC BLOOD PRESSURE: 129 MMHG

## 2018-07-26 DIAGNOSIS — H00.14 CHALAZION LEFT UPPER EYELID: Primary | ICD-10-CM

## 2018-07-26 PROCEDURE — 99283 EMERGENCY DEPT VISIT LOW MDM: CPT

## 2018-07-26 RX ORDER — ERYTHROMYCIN 5 MG/G
OINTMENT OPHTHALMIC
Qty: 1 TUBE | Refills: 0 | Status: SHIPPED | OUTPATIENT
Start: 2018-07-26

## 2018-07-26 NOTE — ED PROVIDER NOTES
"Encounter Date: 7/26/2018       History     Chief Complaint   Patient presents with    left eye swelling     pt c/o intermitt left eye swelling x1 week with "tearing"  and pain. denies trauma or injury, swelling noted to left eye without drainage at this time, denies visual changes or other complaints     See triage          Review of patient's allergies indicates:   Allergen Reactions    Fish containing products Hives     Seafood, shrimp    Iodine and iodide containing products     Naproxen sodium Swelling and Rash     Past Medical History:   Diagnosis Date    Ankle fracture, right     Chronic back pain      History reviewed. No pertinent surgical history.  Family History   Problem Relation Age of Onset    Schizophrenia Mother     Heart disease Father         CHF    Bipolar disorder Sister      Social History   Substance Use Topics    Smoking status: Current Every Day Smoker     Packs/day: 1.00     Types: Cigarettes    Smokeless tobacco: Current User    Alcohol use No      Comment: remote heavy     Review of Systems   Constitutional: Negative.    HENT: Negative.    Eyes: Positive for discharge and itching.   Respiratory: Negative.    Cardiovascular: Negative.    Gastrointestinal: Negative.    Endocrine: Negative.    Genitourinary: Negative.    Musculoskeletal: Negative.    Skin: Negative.    Allergic/Immunologic: Negative.    Neurological: Negative.    Hematological: Negative.    Psychiatric/Behavioral: Negative.    All other systems reviewed and are negative.      Physical Exam     Initial Vitals [07/26/18 0411]   BP Pulse Resp Temp SpO2   129/85 98 18 97.6 °F (36.4 °C) 96 %      MAP       --         Physical Exam    Nursing note and vitals reviewed.  Constitutional: Vital signs are normal. He appears well-developed. He is active and cooperative.   HENT:   Head: Normocephalic and atraumatic.   Eyes: Conjunctivae, EOM and lids are normal. Pupils are equal, round, and reactive to light.       Neck: " Trachea normal and full passive range of motion without pain. Neck supple. No thyroid mass present.   Cardiovascular: Normal rate, regular rhythm, S1 normal, S2 normal, normal heart sounds, intact distal pulses and normal pulses.   Abdominal: Soft. Normal appearance, normal aorta and bowel sounds are normal.   Musculoskeletal: Normal range of motion.   Lymphadenopathy:     He has no axillary adenopathy.   Neurological: He is alert and oriented to person, place, and time.   Skin: Skin is warm, dry and intact.   Psychiatric: He has a normal mood and affect. His speech is normal and behavior is normal. Judgment and thought content normal. Cognition and memory are normal.         ED Course   Procedures  Labs Reviewed - No data to display       Imaging Results    None                               Clinical Impression:   The encounter diagnosis was Chalazion left upper eyelid.                             Rafiq Hopkins MD  07/26/18 [id number]

## 2019-08-19 ENCOUNTER — HOSPITAL ENCOUNTER (EMERGENCY)
Facility: HOSPITAL | Age: 39
Discharge: HOME | End: 2019-08-19
Attending: EMERGENCY MEDICINE
Payer: MEDICARE

## 2019-08-19 VITALS
WEIGHT: 212 LBS | SYSTOLIC BLOOD PRESSURE: 148 MMHG | BODY MASS INDEX: 30.35 KG/M2 | HEIGHT: 70 IN | OXYGEN SATURATION: 97 % | DIASTOLIC BLOOD PRESSURE: 89 MMHG | TEMPERATURE: 98 F | RESPIRATION RATE: 20 BRPM | HEART RATE: 88 BPM

## 2019-08-19 DIAGNOSIS — J06.9 UPPER RESPIRATORY TRACT INFECTION, UNSPECIFIED TYPE: Primary | ICD-10-CM

## 2019-08-19 DIAGNOSIS — R05.9 COUGH: ICD-10-CM

## 2019-08-19 LAB
CTP QC/QA: YES
CTP QC/QA: YES
POC MOLECULAR INFLUENZA A AGN: NEGATIVE
POC MOLECULAR INFLUENZA B AGN: NEGATIVE
S PYO RRNA THROAT QL PROBE: NEGATIVE

## 2019-08-19 PROCEDURE — 63600175 PHARM REV CODE 636 W HCPCS: Mod: ER | Performed by: PHYSICIAN ASSISTANT

## 2019-08-19 PROCEDURE — 87880 STREP A ASSAY W/OPTIC: CPT | Mod: ER

## 2019-08-19 PROCEDURE — 96372 THER/PROPH/DIAG INJ SC/IM: CPT | Mod: ER

## 2019-08-19 PROCEDURE — 87804 INFLUENZA ASSAY W/OPTIC: CPT | Mod: ER

## 2019-08-19 PROCEDURE — 87081 CULTURE SCREEN ONLY: CPT

## 2019-08-19 PROCEDURE — 99284 EMERGENCY DEPT VISIT MOD MDM: CPT | Mod: 25,ER

## 2019-08-19 RX ORDER — BENZONATATE 100 MG/1
100 CAPSULE ORAL 3 TIMES DAILY PRN
Qty: 20 CAPSULE | Refills: 0 | Status: SHIPPED | OUTPATIENT
Start: 2019-08-19 | End: 2019-08-29

## 2019-08-19 RX ORDER — IBUPROFEN 800 MG/1
800 TABLET ORAL EVERY 6 HOURS PRN
Qty: 20 TABLET | Refills: 0 | OUTPATIENT
Start: 2019-08-19 | End: 2020-01-25

## 2019-08-19 RX ORDER — FLUTICASONE PROPIONATE 50 MCG
1 SPRAY, SUSPENSION (ML) NASAL 2 TIMES DAILY PRN
Qty: 15 G | Refills: 0 | OUTPATIENT
Start: 2019-08-19 | End: 2020-03-13

## 2019-08-19 RX ORDER — KETOROLAC TROMETHAMINE 30 MG/ML
30 INJECTION, SOLUTION INTRAMUSCULAR; INTRAVENOUS
Status: COMPLETED | OUTPATIENT
Start: 2019-08-19 | End: 2019-08-19

## 2019-08-19 RX ADMIN — KETOROLAC TROMETHAMINE 30 MG: 30 INJECTION, SOLUTION INTRAMUSCULAR at 10:08

## 2019-08-19 NOTE — ED PROVIDER NOTES
Encounter Date: 8/19/2019    SCRIBE #1 NOTE: I, Katerina Dave, am scribing for, and in the presence of,  Brooke HUSTON. I have scribed the following portions of the note - Other sections scribed: HPI, ROS, PE .       History     Chief Complaint   Patient presents with    flu symptoms     patient stated he started with runny nose yesterday and took mucinex and this morning woke up with body aches and sorethroat     38 y.o male presents to the ED with rhinorrhea, sore throat, sinus pressure, diarrhea, cough, and body aches since yesterday. He denies fever, chills, chest pain, SOB, N/V, or congestion. He has been taking mucinex without relief. He notes upper back pain with coughing and decreased appetite currently.    The history is provided by the patient. No  was used.     Review of patient's allergies indicates:   Allergen Reactions    Fish containing products Hives     Seafood, shrimp    Iodine and iodide containing products     Naproxen sodium Swelling and Rash     Past Medical History:   Diagnosis Date    Ankle fracture, right     Chronic back pain      History reviewed. No pertinent surgical history.  Family History   Problem Relation Age of Onset    Schizophrenia Mother     Heart disease Father         CHF    Bipolar disorder Sister      Social History     Tobacco Use    Smoking status: Current Every Day Smoker     Packs/day: 1.00     Types: Cigarettes    Smokeless tobacco: Current User   Substance Use Topics    Alcohol use: No     Comment: remote heavy    Drug use: No     Review of Systems   Constitutional: Positive for appetite change. Negative for chills and fever.   HENT: Positive for rhinorrhea, sinus pressure and sore throat. Negative for congestion.    Respiratory: Positive for cough. Negative for shortness of breath.    Cardiovascular: Negative for chest pain.   Gastrointestinal: Positive for diarrhea. Negative for nausea and vomiting.   Musculoskeletal: Positive for  back pain and myalgias.   All other systems reviewed and are negative.      Physical Exam     Initial Vitals [08/19/19 0826]   BP Pulse Resp Temp SpO2   (!) 141/93 99 20 99.1 °F (37.3 °C) 96 %      MAP       --         Physical Exam    Nursing note and vitals reviewed.  Constitutional: He appears well-developed and well-nourished. He is not diaphoretic. No distress.   HENT:   Head: Normocephalic and atraumatic.   Right Ear: Tympanic membrane and external ear normal. Tympanic membrane is not erythematous. No middle ear effusion.   Left Ear: Tympanic membrane and external ear normal. Tympanic membrane is not erythematous.  No middle ear effusion.   Mouth/Throat: Uvula is midline, oropharynx is clear and moist and mucous membranes are normal. No oropharyngeal exudate, posterior oropharyngeal edema, posterior oropharyngeal erythema or tonsillar abscesses.   Eyes: EOM are normal.   Neck: Normal range of motion.   Cardiovascular: Normal rate, regular rhythm and normal heart sounds. Exam reveals no gallop and no friction rub.    No murmur heard.  Pulmonary/Chest: Breath sounds normal. No respiratory distress. He has no wheezes. He has no rhonchi. He has no rales.   Musculoskeletal: Normal range of motion.        Thoracic back: He exhibits pain. He exhibits normal range of motion, no tenderness, no bony tenderness, no swelling, no edema, no deformity, no laceration, no spasm and normal pulse.   Neurological: He is alert and oriented to person, place, and time. No cranial nerve deficit or sensory deficit.   Skin: Skin is warm and dry. Capillary refill takes less than 2 seconds.   Psychiatric: He has a normal mood and affect. His behavior is normal.         ED Course   Procedures  Labs Reviewed   CULTURE, STREP A,  THROAT   POCT INFLUENZA A/B MOLECULAR   POCT RAPID STREP A          Imaging Results          X-Ray Chest PA And Lateral (Final result)  Result time 08/19/19 10:52:14    Final result by Ga Padron MD (08/19/19  10:52:14)                 Impression:      No acute abnormality.      Electronically signed by: Ga Padron MD  Date:    08/19/2019  Time:    10:52             Narrative:    EXAMINATION:  XR CHEST PA AND LATERAL    CLINICAL HISTORY:  Cough    TECHNIQUE:  PA and lateral views of the chest were performed.    COMPARISON:  05/28/2010    FINDINGS:  Minimal bandlike density in the right midlung zone stable from prior exam suggests scarring.  Otherwise,the lungs are clear, with normal appearance of pulmonary vasculature and no pleural effusion or pneumothorax.    The cardiac silhouette is normal in size. The hilar and mediastinal contours are unremarkable.    Bones are intact.                                 Medical Decision Making:   Initial Assessment:   Patient has symptoms consistent with a viral URI. Lungs clear to auscultation on exam, hence I doubt pneumonia.  Chest x-ray negative. Flu and strep screen is negative. Patient appears well hydrated and nontoxic clinically. Vitals stable.  No nuchal rigidity, nausea, vomiting, photophobia, or headache, therefore I doubt meningitis at this time. Will d/c home with viral uri instructions, Flonase, Tessalon Perles, and ibuprofen.   Clinical Tests:   Lab Tests: Ordered and Reviewed  Radiological Study: Ordered and Reviewed            Scribe Attestation:   Scribe #1: I performed the above scribed service and the documentation accurately describes the services I performed. I attest to the accuracy of the note.    The document was produced by a scribe under my direction and in my presence.  I agree with the content of the note and have made any necessary edits.    Brooke HUSTON            Clinical Impression:     1. Upper respiratory tract infection, unspecified type    2. Cough            Disposition:   Disposition: Discharged  Condition: Stable                        ROBLE Grey  08/26/19 1015

## 2019-08-21 LAB — BACTERIA THROAT CULT: NORMAL

## 2019-11-12 ENCOUNTER — HOSPITAL ENCOUNTER (EMERGENCY)
Facility: HOSPITAL | Age: 39
Discharge: HOME | End: 2019-11-12
Attending: EMERGENCY MEDICINE
Payer: MEDICARE

## 2019-11-12 VITALS
HEIGHT: 70 IN | BODY MASS INDEX: 28.63 KG/M2 | HEART RATE: 74 BPM | DIASTOLIC BLOOD PRESSURE: 83 MMHG | SYSTOLIC BLOOD PRESSURE: 128 MMHG | WEIGHT: 200 LBS | TEMPERATURE: 98 F | OXYGEN SATURATION: 99 % | RESPIRATION RATE: 18 BRPM

## 2019-11-12 DIAGNOSIS — A09 DIARRHEA OF INFECTIOUS ORIGIN: Primary | ICD-10-CM

## 2019-11-12 DIAGNOSIS — E86.0 DEHYDRATION: ICD-10-CM

## 2019-11-12 DIAGNOSIS — R05.9 COUGH: ICD-10-CM

## 2019-11-12 LAB
ALBUMIN SERPL-MCNC: 3.6 G/DL (ref 3.3–5.5)
ALP SERPL-CCNC: 90 U/L (ref 42–141)
BILIRUB SERPL-MCNC: 1.1 MG/DL (ref 0.2–1.6)
BUN SERPL-MCNC: 10 MG/DL (ref 7–22)
CALCIUM SERPL-MCNC: 9.1 MG/DL (ref 8–10.3)
CHLORIDE SERPL-SCNC: 109 MMOL/L (ref 98–108)
CREAT SERPL-MCNC: 1 MG/DL (ref 0.6–1.2)
GLUCOSE SERPL-MCNC: 96 MG/DL (ref 73–118)
POC ALT (SGPT): 49 U/L (ref 10–47)
POC AST (SGOT): 33 U/L (ref 11–38)
POC TCO2: 26 MMOL/L (ref 18–33)
POTASSIUM BLD-SCNC: 4 MMOL/L (ref 3.6–5.1)
PROTEIN, POC: 7 G/DL (ref 6.4–8.1)
SODIUM BLD-SCNC: 142 MMOL/L (ref 128–145)

## 2019-11-12 PROCEDURE — 99284 EMERGENCY DEPT VISIT MOD MDM: CPT | Mod: 25,ER

## 2019-11-12 PROCEDURE — 85025 COMPLETE CBC W/AUTO DIFF WBC: CPT | Mod: ER

## 2019-11-12 PROCEDURE — 80053 COMPREHEN METABOLIC PANEL: CPT | Mod: ER

## 2019-11-12 PROCEDURE — 96360 HYDRATION IV INFUSION INIT: CPT | Mod: ER

## 2019-11-12 PROCEDURE — 63600175 PHARM REV CODE 636 W HCPCS: Mod: ER | Performed by: EMERGENCY MEDICINE

## 2019-11-12 RX ORDER — SODIUM CHLORIDE 9 MG/ML
1000 INJECTION, SOLUTION INTRAVENOUS
Status: COMPLETED | OUTPATIENT
Start: 2019-11-12 | End: 2019-11-12

## 2019-11-12 RX ORDER — CIPROFLOXACIN 500 MG/1
500 TABLET ORAL 2 TIMES DAILY
Qty: 10 TABLET | Refills: 0 | Status: SHIPPED | OUTPATIENT
Start: 2019-11-12 | End: 2019-11-17

## 2019-11-12 RX ADMIN — SODIUM CHLORIDE 1000 ML: 0.9 INJECTION, SOLUTION INTRAVENOUS at 10:11

## 2019-11-12 NOTE — ED PROVIDER NOTES
Encounter Date: 11/12/2019       History     Chief Complaint   Patient presents with    Cough     x 2 weeks     Chills     x days comes and goes    Diarrhea     x 2 weeks- green- comes and goes     This is a 39-year-old male who comes in complaining of cough as well as diarrhea for the past 2 weeks.  Patient reports that the onset of the symptoms he thought he had a viral syndrome.  He had vomiting and diarrhea.  Vomiting resolved within 2 days but the diarrhea has continued for the past 2 weeks.  He reports that is nonbloody nonbilious.  He also reports that the cough has continued over the past 2 weeks.  He denies any fevers or chills.  No abdominal pain.  He reports that he just feels fatigued all the time.  He denies any exacerbating or alleviating factors.  He denies any history of recent antibiotic use.  No history of travel.  No sick contacts.        Review of patient's allergies indicates:   Allergen Reactions    Fish containing products Hives     Seafood, shrimp    Iodine and iodide containing products     Naproxen sodium Swelling and Rash     Past Medical History:   Diagnosis Date    Ankle fracture, right     Chronic back pain      No past surgical history on file.  Family History   Problem Relation Age of Onset    Schizophrenia Mother     Heart disease Father         CHF    Bipolar disorder Sister      Social History     Tobacco Use    Smoking status: Current Every Day Smoker     Packs/day: 1.00     Types: Cigarettes    Smokeless tobacco: Current User   Substance Use Topics    Alcohol use: No     Comment: remote heavy    Drug use: No     Review of Systems   Constitutional: Negative for chills and fever.   HENT: Negative for congestion, sore throat and trouble swallowing.    Respiratory: Positive for cough. Negative for shortness of breath.    Cardiovascular: Negative for chest pain and palpitations.   Gastrointestinal: Positive for diarrhea, nausea and vomiting. Negative for abdominal pain.    Musculoskeletal: Negative for back pain and neck pain.   Neurological: Positive for weakness. Negative for numbness and headaches.   All other systems reviewed and are negative.      Physical Exam     Initial Vitals [11/12/19 0954]   BP Pulse Resp Temp SpO2   (!) 149/67 88 18 97.8 °F (36.6 °C) 96 %      MAP       --         Physical Exam    Nursing note and vitals reviewed.  Constitutional: Vital signs are normal. He appears well-developed and well-nourished.  Non-toxic appearance. No distress.   HENT:   Head: Normocephalic and atraumatic.   Dry mucous membranes   Eyes: Conjunctivae and EOM are normal. Pupils are equal, round, and reactive to light.   Neck: Normal range of motion. Neck supple. No muscular tenderness present. No neck rigidity.   Cardiovascular: Normal rate, regular rhythm and intact distal pulses.   Pulmonary/Chest: Breath sounds normal. He has no wheezes.   Abdominal: Soft. Normal appearance and bowel sounds are normal. There is no tenderness.   Musculoskeletal: Normal range of motion.   Lymphadenopathy:     He has no cervical adenopathy.     He has no axillary adenopathy.   Neurological: He is alert and oriented to person, place, and time. He has normal strength. No cranial nerve deficit or sensory deficit. Gait normal.   Skin: Skin is warm, dry and intact.   Psychiatric: He has a normal mood and affect. His behavior is normal.         ED Course   Procedures  Labs Reviewed   POCT CMP - Abnormal; Notable for the following components:       Result Value    ALT (SGPT), POC 49 (*)     POC Chloride 109 (*)     All other components within normal limits   POCT CBC   POCT CMP          Imaging Results          X-Ray Chest PA And Lateral (Final result)  Result time 11/12/19 10:12:41    Final result by Dean Alberto MD (11/12/19 10:12:41)                 Impression:      Stable two-view appearance of the chest without evidence of acute pulmonic process.      Electronically signed by: Dean Alberto  MD  Date:    11/12/2019  Time:    10:12             Narrative:    EXAMINATION:  XR CHEST PA AND LATERAL    CLINICAL HISTORY:  Cough.  No reported smoking history.    TECHNIQUE:  PA and lateral views of the chest were performed.    COMPARISON:  Prior study dated 19 August 2019.    FINDINGS:  The trachea remains midline and there is stable unremarkable appearance of the cardiomediastinal shadow.  Both hilar regions are also stable.    The lungs remain fully expanded and clear.  The hemidiaphragms are sharply outlined.  The costophrenic angles are acute with no pleural effusion demonstrated.    There is stable radiographic appearance of the included osseous structures.                                 Medical Decision Making:   Initial Assessment:   This is a 39-year-old male with no significant past medical history comes in complaining of 2 week history of cough, diarrhea and generalized weakness. On examination his vitals are stable.  He is afebrile.  On physical exam he has dry mucous membranes.  Abdomen is benign.  Lungs are clear.  Exam is unremarkable otherwise.  Orders included CBC, CMP, chest x-ray.  He was hydrated.  Differential Diagnosis:   Infectious diarrhea, dehydration, gastroenteritis, pneumonia, bronchitis, electrolyte abnormality, TERESITA, viral syndrome, C diff colitis.  Independently Interpreted Test(s):   I have ordered and independently interpreted X-rays - see summary below.       <> Summary of X-Ray Reading(s): Chest x-ray showed no infiltrate or effusion.  Clinical Tests:   Lab Tests: Ordered and Reviewed  The following lab test(s) were unremarkable: CBC and CMP       <> Summary of Lab: Labs were reviewed were significant for a white count of 10.5.  H&H were elevated secondary to hemoconcentration likely.  ED Management:  Patient's workup was unremarkable. He did appear to be dehydrated based on his labs clinical findings.  As diarrhea has been ongoing for 2 weeks he will be placed on antibiotics.   He could not provide a stool specimen in the ER.  He was advised to follow up closely outpatient and return to the ER for any concerns.                                 Clinical Impression:       ICD-10-CM ICD-9-CM   1. Diarrhea of infectious origin A09 009.2   2. Cough R05 786.2   3. Dehydration E86.0 276.51         Disposition:   Disposition: Discharged  Condition: Stable                     Makayla Aguirre MD  11/12/19 1040

## 2019-12-31 NOTE — TELEPHONE ENCOUNTER
Call back placed. Caroline states pt is responsive at this time but is having black out due to pinched nerve and is refusing medical attention. Again advised how important for her to call EMS now.   minimum assist (75% patients effort)

## 2020-01-25 ENCOUNTER — HOSPITAL ENCOUNTER (EMERGENCY)
Facility: HOSPITAL | Age: 40
Discharge: HOME | End: 2020-01-25
Attending: EMERGENCY MEDICINE
Payer: MEDICARE

## 2020-01-25 VITALS
TEMPERATURE: 99 F | WEIGHT: 205 LBS | HEIGHT: 70 IN | SYSTOLIC BLOOD PRESSURE: 122 MMHG | OXYGEN SATURATION: 100 % | DIASTOLIC BLOOD PRESSURE: 79 MMHG | HEART RATE: 88 BPM | RESPIRATION RATE: 18 BRPM | BODY MASS INDEX: 29.35 KG/M2

## 2020-01-25 DIAGNOSIS — M54.42 ACUTE LEFT-SIDED LOW BACK PAIN WITH LEFT-SIDED SCIATICA: Primary | ICD-10-CM

## 2020-01-25 PROCEDURE — 63600175 PHARM REV CODE 636 W HCPCS: Mod: ER | Performed by: NURSE PRACTITIONER

## 2020-01-25 PROCEDURE — 99284 EMERGENCY DEPT VISIT MOD MDM: CPT | Mod: 25,ER

## 2020-01-25 PROCEDURE — 96372 THER/PROPH/DIAG INJ SC/IM: CPT | Mod: ER

## 2020-01-25 RX ORDER — METHYLPREDNISOLONE 4 MG/1
TABLET ORAL
Qty: 1 PACKAGE | Refills: 0 | Status: SHIPPED | OUTPATIENT
Start: 2020-01-25 | End: 2020-02-15

## 2020-01-25 RX ORDER — METHOCARBAMOL 750 MG/1
750 TABLET, FILM COATED ORAL EVERY 8 HOURS PRN
Qty: 20 TABLET | Refills: 0 | Status: SHIPPED | OUTPATIENT
Start: 2020-01-25 | End: 2022-02-01

## 2020-01-25 RX ORDER — KETOROLAC TROMETHAMINE 30 MG/ML
15 INJECTION, SOLUTION INTRAMUSCULAR; INTRAVENOUS
Status: COMPLETED | OUTPATIENT
Start: 2020-01-25 | End: 2020-01-25

## 2020-01-25 RX ADMIN — KETOROLAC TROMETHAMINE 15 MG: 30 INJECTION, SOLUTION INTRAMUSCULAR at 01:01

## 2020-01-25 NOTE — ED PROVIDER NOTES
Encounter Date: 1/25/2020       History     Chief Complaint   Patient presents with    Back Pain     pt presents to ED with c/o left lower back pain after lifting his brother's scooter into back of [place]. States he lifted and then felt the pain in his lower back     The history is provided by the patient. No  was used.   Back Pain    This is a new problem. The current episode started 1 to 2 hours ago. The problem occurs constantly. The problem has been unchanged. The pain is associated with lifting heavy objects. The pain is present in the lumbar spine. The quality of the pain is described as shooting. The pain radiates to the left leg. The pain is at a severity of 7/10. The symptoms are aggravated by twisting and certain positions. The pain is the same all the time. Pertinent negatives include no chest pain, no fever, no numbness, no weight loss, no headaches, no abdominal pain, no abdominal swelling, no bowel incontinence, no perianal numbness, no bladder incontinence, no dysuria, no pelvic pain, no paresthesias, no paresis, no tingling and no weakness. He has tried nothing for the symptoms. The treatment provided no relief. Risk factors include lack of exercise and a sedentary lifestyle.     Review of patient's allergies indicates:   Allergen Reactions    Fish containing products Hives     Seafood, shrimp    Iodine and iodide containing products     Naproxen sodium Swelling and Rash     Past Medical History:   Diagnosis Date    Ankle fracture, right     Chronic back pain      History reviewed. No pertinent surgical history.  Family History   Problem Relation Age of Onset    Schizophrenia Mother     Heart disease Father         CHF    Bipolar disorder Sister      Social History     Tobacco Use    Smoking status: Current Every Day Smoker     Packs/day: 1.00     Types: Cigarettes    Smokeless tobacco: Current User   Substance Use Topics    Alcohol use: No     Comment: remote heavy     Drug use: No     Review of Systems   Constitutional: Negative.  Negative for activity change, chills, diaphoresis, fever and weight loss.   HENT: Negative.  Negative for congestion, nosebleeds, rhinorrhea, sinus pressure, sinus pain, sore throat and trouble swallowing.    Eyes: Negative.  Negative for pain, discharge, redness and itching.   Respiratory: Negative.  Negative for cough, chest tightness, shortness of breath, wheezing and stridor.    Cardiovascular: Negative.  Negative for chest pain, palpitations and leg swelling.   Gastrointestinal: Negative.  Negative for abdominal pain, bowel incontinence, constipation, diarrhea, nausea and vomiting.   Endocrine: Negative.  Negative for cold intolerance, heat intolerance, polydipsia, polyphagia and polyuria.   Genitourinary: Negative.  Negative for bladder incontinence, difficulty urinating, discharge, dysuria, frequency, genital sores, pelvic pain, penile pain, scrotal swelling and testicular pain.   Musculoskeletal: Positive for back pain. Negative for gait problem, joint swelling and neck pain.   Skin: Negative.  Negative for color change, rash and wound.   Allergic/Immunologic: Negative.    Neurological: Negative.  Negative for dizziness, tingling, tremors, seizures, weakness, light-headedness, numbness, headaches and paresthesias.   Hematological: Negative.    Psychiatric/Behavioral: Negative.  Negative for agitation, behavioral problems, confusion, hallucinations, self-injury and suicidal ideas. The patient is not nervous/anxious and is not hyperactive.    All other systems reviewed and are negative.      Physical Exam     Initial Vitals [01/25/20 1249]   BP Pulse Resp Temp SpO2   126/70 97 18 98.3 °F (36.8 °C) 97 %      MAP       --         Physical Exam    Nursing note and vitals reviewed.  Constitutional: He appears well-developed and well-nourished. He is not diaphoretic.  Non-toxic appearance. He does not appear ill. No distress.   HENT:   Head:  Normocephalic and atraumatic.   Eyes: Conjunctivae are normal.   Neck: Normal range of motion.   Cardiovascular: Normal rate, regular rhythm, normal heart sounds and intact distal pulses. Exam reveals no gallop and no friction rub.    No murmur heard.  Pulmonary/Chest: Breath sounds normal. No respiratory distress. He has no wheezes. He has no rhonchi. He has no rales. He exhibits no tenderness.   Abdominal: Soft. Normal appearance and bowel sounds are normal. He exhibits no distension and no mass. There is no hepatosplenomegaly. There is no tenderness. There is no rebound, no guarding and no CVA tenderness. No hernia.   Musculoskeletal: Normal range of motion.   5/5 motor strength BLE with intact sensation  Negative SLR BLE  Normal heel/toe BLE  2+ reflexes BLE  No bony tenderness  No s/s cauda equina   Neurological: He is alert and oriented to person, place, and time.   Skin: Skin is warm and dry. Capillary refill takes less than 2 seconds. No rash noted.   Psychiatric: He has a normal mood and affect. His behavior is normal. Judgment and thought content normal.         ED Course   Procedures  Labs Reviewed - No data to display       Imaging Results    None          Medical Decision Making:   Initial Assessment:   Lumbago with sciatica  Differential Diagnosis:   Cauda equina, bony tenderness  Clinical Tests:   Radiological Study: Reviewed and Ordered  ED Management:  No bony tenderness noted. No evidence of cauda equina noted on exam.  No imaging is warranted at this time.  Toradol IM given in the ER.  Patient be discharged home on Robaxin and Medrol Dosepak with instructions to refrain from strenuous activity, use over-the-counter icy hot and/or heating pad as needed, follow up with his primary care provider in 2 days and return to the ER as needed if symptoms worsen or fail to improve.  The patient verbalized understanding of discharge instructions and treatment plan.                                 Clinical  Impression:       ICD-10-CM ICD-9-CM   1. Acute left-sided low back pain with left-sided sciatica M54.42 724.2     724.3                             Toussaint Battley III, [facility]  01/25/20 5106

## 2020-03-13 ENCOUNTER — HOSPITAL ENCOUNTER (EMERGENCY)
Facility: HOSPITAL | Age: 40
Discharge: HOME | End: 2020-03-13
Attending: EMERGENCY MEDICINE
Payer: COMMERCIAL

## 2020-03-13 VITALS
RESPIRATION RATE: 20 BRPM | SYSTOLIC BLOOD PRESSURE: 167 MMHG | OXYGEN SATURATION: 99 % | DIASTOLIC BLOOD PRESSURE: 81 MMHG | HEART RATE: 100 BPM | HEIGHT: 70 IN | TEMPERATURE: 98 F | WEIGHT: 200 LBS | BODY MASS INDEX: 28.63 KG/M2

## 2020-03-13 DIAGNOSIS — J06.9 VIRAL URI: Primary | ICD-10-CM

## 2020-03-13 LAB
CTP QC/QA: YES
POC MOLECULAR INFLUENZA A AGN: NEGATIVE
POC MOLECULAR INFLUENZA B AGN: NEGATIVE

## 2020-03-13 PROCEDURE — 99283 EMERGENCY DEPT VISIT LOW MDM: CPT | Mod: 25,ER

## 2020-03-13 PROCEDURE — 87502 INFLUENZA DNA AMP PROBE: CPT | Mod: ER

## 2020-03-13 RX ORDER — GUAIFENESIN/DEXTROMETHORPHAN 100-10MG/5
5 SYRUP ORAL 4 TIMES DAILY PRN
Qty: 120 ML | Refills: 0 | Status: SHIPPED | OUTPATIENT
Start: 2020-03-13 | End: 2020-03-23

## 2020-03-13 RX ORDER — FLUTICASONE PROPIONATE 50 MCG
1 SPRAY, SUSPENSION (ML) NASAL 2 TIMES DAILY PRN
Qty: 15 G | Refills: 0 | Status: SHIPPED | OUTPATIENT
Start: 2020-03-13 | End: 2020-03-20

## 2020-03-13 RX ORDER — CETIRIZINE HYDROCHLORIDE 10 MG/1
10 TABLET ORAL DAILY
Qty: 30 TABLET | Refills: 0 | Status: SHIPPED | OUTPATIENT
Start: 2020-03-13 | End: 2020-04-12

## 2020-03-13 NOTE — ED PROVIDER NOTES
Encounter Date: 3/13/2020    SCRIBE #1 NOTE: I, Anahi Rodriguez, am scribing for, and in the presence of,  ROBEL Katz. I have scribed the entire note. Other sections scribed: HPI, ROS, PE.       History     Chief Complaint   Patient presents with    Nasal Congestion     X 3 DAYS     Abner Vasquez is a 39 y.o. male who has chronic back pain presents to the ED complaining of nasal congestion and pressurized HA x 3 days. Denies fever, N/V/D, SOB, appetite changes, and CP. Patient reports taking Mucinex for symptoms with no relief. Patient denies travel abroad, sick contact, or exposure to COVID-19. Patient smokes half a pack of cigarettes. Patient has no past hx of surgeries or disease. Denies taking drugs or drinking EtOH.      The history is provided by the patient. No  was used.     Review of patient's allergies indicates:   Allergen Reactions    Fish containing products Hives     Seafood, shrimp    Iodine and iodide containing products     Naproxen sodium Swelling and Rash     Past Medical History:   Diagnosis Date    Ankle fracture, right     Chronic back pain      History reviewed. No pertinent surgical history.  Family History   Problem Relation Age of Onset    Schizophrenia Mother     Heart disease Father         CHF    Bipolar disorder Sister      Social History     Tobacco Use    Smoking status: Current Every Day Smoker     Packs/day: 1.00     Types: Cigarettes    Smokeless tobacco: Current User   Substance Use Topics    Alcohol use: No     Comment: remote heavy    Drug use: No     Review of Systems   Constitutional: Negative for appetite change, chills and fever.   HENT: Positive for congestion. Negative for rhinorrhea and sore throat.    Eyes: Negative for visual disturbance.   Respiratory: Negative for cough and shortness of breath.    Cardiovascular: Negative for chest pain.   Gastrointestinal: Negative for abdominal pain, diarrhea, nausea and vomiting.    Genitourinary: Negative for dysuria, frequency and hematuria.   Musculoskeletal: Negative for back pain.   Skin: Negative for rash.   Neurological: Positive for headaches. Negative for dizziness and weakness.       Physical Exam     Initial Vitals [03/13/20 1249]   BP Pulse Resp Temp SpO2   (!) 140/90 94 18 97.6 °F (36.4 °C) 96 %      MAP       --         Physical Exam    Nursing note and vitals reviewed.  Constitutional: He appears well-developed and well-nourished. No distress.   HENT:   Head: Normocephalic and atraumatic.   Right Ear: Tympanic membrane normal.   Left Ear: Tympanic membrane normal.   Nose: Nose normal.   Mouth/Throat: Uvula is midline, oropharynx is clear and moist and mucous membranes are normal.   Eyes: EOM are normal. Pupils are equal, round, and reactive to light.   Neck: Trachea normal, normal range of motion, full passive range of motion without pain and phonation normal. Neck supple. No stridor present. No spinous process tenderness and no muscular tenderness present. Normal range of motion present. No neck rigidity.   Cardiovascular: Normal rate, regular rhythm and normal heart sounds. Exam reveals no gallop and no friction rub.    No murmur heard.  Pulmonary/Chest: Effort normal and breath sounds normal. No respiratory distress. He has no wheezes. He has no rhonchi. He has no rales.   Abdominal: Soft. Bowel sounds are normal. He exhibits no mass. There is no tenderness. There is no rebound and no guarding.   Musculoskeletal: Normal range of motion.   Neurological: He is alert and oriented to person, place, and time. He has normal strength. No cranial nerve deficit or sensory deficit.   Skin: Skin is warm and dry. Capillary refill takes less than 2 seconds.   Psychiatric: He has a normal mood and affect.         ED Course   Procedures  Labs Reviewed   POCT INFLUENZA A/B MOLECULAR          Imaging Results          X-Ray Chest PA And Lateral (Final result)  Result time 03/13/20 13:19:38     Final result by Rick Mays MD (03/13/20 13:19:38)                 Impression:      No acute chest disease identified.      Electronically signed by: Rick Mays MD  Date:    03/13/2020  Time:    13:19             Narrative:    EXAMINATION:  XR CHEST PA AND LATERAL    CLINICAL HISTORY:  cough;    TECHNIQUE:  PA and lateral views of the chest were performed.    COMPARISON:  11/12/2019.    FINDINGS:  The cardiac silhouette and superior mediastinal structures are unremarkable. Pulmonary vasculature is within normal limits. The lungs are well aerated and free of focal consolidations. There is no evidence for pneumothorax or pleural effusions. Bony structures are grossly intact.                                 Medical Decision Making:   Clinical Tests:   Lab Tests: Ordered and Reviewed  Radiological Study: Ordered and Reviewed  ED Management:  This is an evaluation of a 39 y.o. male that presents to the Emergency Department for cough, rhinorrhea and nasal congestion for 3 days. The patient is a non-toxic, afebrile, and well appearing male. On physical exam ears and pharynx are without evidence of infection. Appears well hydrated with moist mucus membranes. Neck soft and supple with no meningeal signs or cervical lymphadenopathy. Breath sounds are clear and equal bilaterally with no adventitious breath sounds, tachypnea or respiratory distress with room air pulse ox of 99% and no evidence of hypoxia.     Vital Signs Are Reassuring.  RESULTS:  Influenza negative.  Chest x-ray shows no acute cardiopulmonary process.    My overall impression is Viral URI. I considered, but at this time, do not suspect OM, OE, strep pharyngitis, meningitis, pneumonia, or acute bacterial sinusitis.     discharge patient home with Robitussin, Zyrtec and Flonase. The diagnosis, treatment plan, instructions for follow-up and reevaluation with PCP as well as ED return precautions were discussed and understanding was verbalized. All  questions or concerns have been addressed.             Scribe Attestation:   Scribe #1: I performed the above scribed service and the documentation accurately describes the services I performed. I attest to the accuracy of the note.                          Clinical Impression:     1. Viral URI                ED Disposition Condition    Discharge Stable        ED Prescriptions     Medication Sig Dispense Start Date End Date Auth. Provider    cetirizine (ZYRTEC) 10 MG tablet Take 1 tablet (10 mg total) by mouth once daily. 30 tablet 3/13/2020 4/12/2020 Oliver Chauhan PA-C    fluticasone propionate (FLONASE) 50 mcg/actuation nasal spray 1 spray (50 mcg total) by Each Nostril route 2 (two) times daily as needed for Rhinitis. 15 g 3/13/2020 3/20/2020 Oliver Chauhan PA-C    dextromethorphan-guaifenesin  mg/5 ml (ROBITUSSIN-DM)  mg/5 mL liquid Take 5 mLs by mouth 4 (four) times daily as needed (cough). 120 mL 3/13/2020 3/23/2020 Oliver Chauhan PA-C        Follow-up Information     Follow up With Specialties Details Why Contact Info    Fred Resendiz MD Internal Medicine   [address]  [place] [address]  [phone]      [facility] Emergency Department Emergency Medicine Go in 1 day If symptoms worsen [address] [facility] [id number] [phone]                                     Oliver Chauhan PA-C  03/13/20 1450

## 2020-03-13 NOTE — ED NOTES
"Medical screening exam completed.  I have conducted a focused provider triage encounter, findings are as follows:    Brief history of present illness:  Patient complains of runny nose, cough, and nasal congestion with no fever for 3 days.  Tried mucinex with minimal relief    Vitals:    03/13/20 1249   BP: (!) 140/90   BP Location: Right arm   Patient Position: Sitting   Pulse: 94   Resp: 18   Temp: 97.6 °F (36.4 °C)   SpO2: 96%   Weight: 90.7 kg (200 lb)   Height: 5' 10" (1.778 m)       Pertinent physical exam:  Stable VS with no acute distress    Brief workup plan:  Influenza, CXR    Preliminary workup initiated; this workup will be continued and followed by the physician or advanced practice provider that is assigned to the patient when roomed.       Lexis Butler, FNP  03/13/20 1249    "

## 2020-03-13 NOTE — ED NOTES
"Patient to ed with c/o feeling "hot and cold" for the past few days. Pt denies fever. Pt reports nonprod cough for 3 days. Pt reports sinus pressure and facial pain for one day. Patient reports eye swelling/tearing and runny nose. Pt reports taking mucinex at home without relief. resp even and unlabored.denies fever. Pt has no other c/o. Will continue to monitor.   "

## 2020-10-05 ENCOUNTER — PATIENT MESSAGE (OUTPATIENT)
Dept: ADMINISTRATIVE | Facility: HOSPITAL | Age: 40
End: 2020-10-05

## 2021-01-04 ENCOUNTER — PATIENT MESSAGE (OUTPATIENT)
Dept: ADMINISTRATIVE | Facility: HOSPITAL | Age: 41
End: 2021-01-04

## 2021-04-05 ENCOUNTER — PATIENT MESSAGE (OUTPATIENT)
Dept: ADMINISTRATIVE | Facility: HOSPITAL | Age: 41
End: 2021-04-05

## 2021-04-16 ENCOUNTER — PATIENT MESSAGE (OUTPATIENT)
Dept: RESEARCH | Facility: HOSPITAL | Age: 41
End: 2021-04-16

## 2022-01-13 ENCOUNTER — HOSPITAL ENCOUNTER (EMERGENCY)
Facility: HOSPITAL | Age: 42
Discharge: HOME | End: 2022-01-13
Attending: EMERGENCY MEDICINE
Payer: COMMERCIAL

## 2022-01-13 VITALS
OXYGEN SATURATION: 97 % | BODY MASS INDEX: 28.63 KG/M2 | WEIGHT: 200 LBS | SYSTOLIC BLOOD PRESSURE: 137 MMHG | RESPIRATION RATE: 19 BRPM | HEART RATE: 83 BPM | TEMPERATURE: 98 F | HEIGHT: 70 IN | DIASTOLIC BLOOD PRESSURE: 92 MMHG

## 2022-01-13 DIAGNOSIS — M25.579 ANKLE PAIN: ICD-10-CM

## 2022-01-13 PROCEDURE — 99284 EMERGENCY DEPT VISIT MOD MDM: CPT | Mod: 25,ER

## 2022-01-13 NOTE — ED PROVIDER NOTES
Encounter Date: 1/13/2022    SCRIBE #1 NOTE: I, Jocy Casey, am scribing for, and in the presence of,  Mandie Kong MD. I have scribed the following portions of the note - Other sections scribed: HPI; ROS; PE.       History     Chief Complaint   Patient presents with    Ankle Pain     Pt reports right ankle pain with difficulty ambulating x2 days, pt has hx of right ankle injury     Abner Vasquez is a 41 y.o. male with Hx of 2 right ankle fractures who presents to the ED for chief complaint of right ankle pain onset 2 days ago. Patient reports pain with ambulation and is unable to bear weight on his right ankle. He denies recent injury. Patient has not attempted treatment for his pain. No further complaints at this present time.    The history is provided by the patient. No  was used.     Review of patient's allergies indicates:   Allergen Reactions    Fish containing products Hives     Seafood, shrimp    Iodine and iodide containing products     Naproxen sodium Swelling and Rash     Past Medical History:   Diagnosis Date    Ankle fracture, right     Chronic back pain      History reviewed. No pertinent surgical history.  Family History   Problem Relation Age of Onset    Schizophrenia Mother     Heart disease Father         CHF    Bipolar disorder Sister      Social History     Tobacco Use    Smoking status: Current Every Day Smoker     Packs/day: 1.00     Types: Cigarettes    Smokeless tobacco: Current User   Substance Use Topics    Alcohol use: No     Comment: remote heavy    Drug use: No     Review of Systems   Musculoskeletal: Positive for arthralgias.   All other systems reviewed and are negative.      Physical Exam     Initial Vitals [01/13/22 0915]   BP Pulse Resp Temp SpO2   (!) 147/104 84 19 97.6 °F (36.4 °C) 97 %      MAP       --         Physical Exam    Constitutional: Vital signs are normal. He appears well-developed.  Non-toxic appearance.   HENT:   Head:  Normocephalic and atraumatic.   Eyes: Conjunctivae and lids are normal.   Neck: Trachea normal. Neck supple.   Normal range of motion.  Cardiovascular: Normal rate, regular rhythm, normal heart sounds, intact distal pulses and normal pulses.   Pulses:       Dorsalis pedis pulses are 2+ on the right side and 2+ on the left side.   Lungs clear to auscultation B, no wheezes. Good air movement   Abdominal: Abdomen is soft. Normal appearance and bowel sounds are normal. There is no abdominal tenderness.   Musculoskeletal:         General: Normal range of motion.      Cervical back: Normal range of motion and neck supple.      Right knee: Normal.        Legs:      Lymphadenopathy:     He has no cervical adenopathy.   Neurological: He is alert. He has normal strength and normal reflexes. No cranial nerve deficit or sensory deficit. He displays a negative Romberg sign. Coordination and gait normal. GCS eye subscore is 4. GCS verbal subscore is 5. GCS motor subscore is 6.   Skin: Skin is warm and dry.   Psychiatric: He has a normal mood and affect. His speech is normal and behavior is normal.         ED Course   Procedures  Labs Reviewed - No data to display       Imaging Results          CT Foot Without Contrast Right (Final result)  Result time 01/13/22 12:23:08   Procedure changed from CT Foot Without Contrast Left     Final result by Enrique Ruiz DO (01/13/22 12:23:08)                 Impression:      1. No acute osseous abnormality of the right foot.  2. 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  3. Two metallic foreign bodies within the soft tissues as above.      Electronically signed by: Enrique Riuz  Date:    01/13/2022  Time:    12:23             Narrative:    EXAMINATION:  CT FOOT WITHOUT CONTRAST RIGHT    CLINICAL HISTORY:  Foot pain, persistent post-traumatic;    TECHNIQUE:  Axial CT images of the right ankle without intravenous contrast.  Sagittal and coronal reformats  performed.    COMPARISON:  Right ankle radiographs from 01/13/2022.    FINDINGS:  Bone: There is no acute fracture or dislocation of the right ankle.  There is a remote fracture of the medial malleolus with chronic nonunion.  There is a small osseous density between the medial malleolus and the talar dome compatible with a remote avulsed fracture fragment.  There is a small Achilles calcaneal enthesophyte.  Alignment is normal.    Articulations: The right ankle joint is unremarkable.  There is no large effusion.  The joint spaces are preserved.    Soft tissues: There is a punctate radiopaque metallic foreign body within the soft tissues anterior to the lateral malleolus.  There is a 1.0 cm linear metallic foreign body within the soft tissues of the dorsal midfoot.  There is no significant soft tissue edema or large fluid collection.    Miscellaneous: The flexor extensor tendons, peroneal tendons, and Achilles tendon are grossly unremarkable.  The Lisfranc ligament is intact.                               X-Ray Ankle Complete Right (Final result)  Result time 01/13/22 10:46:27    Final result by Enrique Ruiz DO (01/13/22 10:46:27)                 Impression:      No acute osseous abnormality of the right ankle.      Electronically signed by: Enrique Ruiz  Date:    01/13/2022  Time:    10:46             Narrative:    EXAMINATION:  XR ANKLE COMPLETE 3 VIEW RIGHT    CLINICAL HISTORY:  Pain in unspecified ankle and joints of unspecified foot    TECHNIQUE:  AP, lateral, and oblique images of the right ankle were performed.    COMPARISON:  09/03/2017.    FINDINGS:  No acute fracture or dislocation.  Probable remote healed fracture of the medial malleolus noted, unchanged appearance from prior.  There is a small Achilles calcaneal enthesophyte.  Alignment is normal. The ankle mortise is congruent. The talar dome is intact. Joint spaces are preserved. No effusion.  There is a linear radiopaque foreign body measuring  approximately 1 cm within the lateral soft tissues of the midfoot.                                 Medications - No data to display  Medical Decision Making:   History:   Old Medical Records: I decided to obtain old medical records.  Independently Interpreted Test(s):   I have ordered and independently interpreted X-rays - see prior notes.  Clinical Tests:   Radiological Study: Ordered and Reviewed  12:30 PM I spoke to patient about plan, treatment, and results        Scribe Attestation:   Scribe #1: I performed the above scribed service and the documentation accurately describes the services I performed. I attest to the accuracy of the note.               I, Mandie Kong, personally performed the services described in this documentation. All medical record entries made by the scribe were at my direction and in my presence.  I have reviewed the chart and agree that the record reflects my personal performance and is accurate and complete.Mandie Kong M.D. 10:28 AM01/13/2022  Clinical Impression:   Final diagnoses:  [M25.579] Ankle pain          ED Disposition Condition    Discharge Stable        ED Prescriptions     None        Follow-up Information     Follow up With Specialties Details Why Contact Info Additional Information    Fred Resendiz MD Internal Medicine Schedule an appointment as soon as possible for a visit in 2 days  [address]  [place] [address]  [phone]       [facility] Orthopedics Schedule an appointment as soon as possible for a visit in 1 week  [address] [facility] [address]  [phone]     Kaylynn Alberto  Orthopedics Orthopedics Schedule an appointment as soon as possible for a visit in 1 week  [address] [facility], [address] [id number] [phone] Please park in surface lot and use [facility] entrance. Check in at main registration.            Mandie Kong MD  01/13/22 1800

## 2022-02-01 ENCOUNTER — HOSPITAL ENCOUNTER (EMERGENCY)
Facility: HOSPITAL | Age: 42
Discharge: HOME | End: 2022-02-01
Attending: EMERGENCY MEDICINE
Payer: COMMERCIAL

## 2022-02-01 VITALS
RESPIRATION RATE: 17 BRPM | DIASTOLIC BLOOD PRESSURE: 78 MMHG | BODY MASS INDEX: 30.35 KG/M2 | SYSTOLIC BLOOD PRESSURE: 117 MMHG | HEART RATE: 89 BPM | TEMPERATURE: 98 F | HEIGHT: 70 IN | WEIGHT: 212 LBS | OXYGEN SATURATION: 98 %

## 2022-02-01 DIAGNOSIS — M54.41 ACUTE RIGHT-SIDED LOW BACK PAIN WITH RIGHT-SIDED SCIATICA: Primary | ICD-10-CM

## 2022-02-01 PROBLEM — Z72.0 TOBACCO USER: Status: ACTIVE | Noted: 2022-01-27

## 2022-02-01 PROCEDURE — 25000003 PHARM REV CODE 250: Mod: ER | Performed by: EMERGENCY MEDICINE

## 2022-02-01 PROCEDURE — 99284 EMERGENCY DEPT VISIT MOD MDM: CPT | Mod: 25,ER

## 2022-02-01 PROCEDURE — 63600175 PHARM REV CODE 636 W HCPCS: Mod: ER | Performed by: EMERGENCY MEDICINE

## 2022-02-01 PROCEDURE — 96372 THER/PROPH/DIAG INJ SC/IM: CPT | Mod: ER

## 2022-02-01 PROCEDURE — 81003 URINALYSIS AUTO W/O SCOPE: CPT | Mod: ER

## 2022-02-01 RX ORDER — DEXAMETHASONE SODIUM PHOSPHATE 4 MG/ML
8 INJECTION, SOLUTION INTRA-ARTICULAR; INTRALESIONAL; INTRAMUSCULAR; INTRAVENOUS; SOFT TISSUE
Status: COMPLETED | OUTPATIENT
Start: 2022-02-01 | End: 2022-02-01

## 2022-02-01 RX ORDER — CYCLOBENZAPRINE HCL 10 MG
10 TABLET ORAL 3 TIMES DAILY PRN
Qty: 15 TABLET | Refills: 0 | Status: SHIPPED | OUTPATIENT
Start: 2022-02-01 | End: 2022-02-06

## 2022-02-01 RX ORDER — CYCLOBENZAPRINE HCL 10 MG
10 TABLET ORAL
Status: COMPLETED | OUTPATIENT
Start: 2022-02-01 | End: 2022-02-01

## 2022-02-01 RX ORDER — ACETAMINOPHEN 500 MG
1000 TABLET ORAL EVERY 6 HOURS PRN
Qty: 30 TABLET | Refills: 0 | Status: SHIPPED | OUTPATIENT
Start: 2022-02-01

## 2022-02-01 RX ORDER — KETOROLAC TROMETHAMINE 30 MG/ML
30 INJECTION, SOLUTION INTRAMUSCULAR; INTRAVENOUS
Status: COMPLETED | OUTPATIENT
Start: 2022-02-01 | End: 2022-02-01

## 2022-02-01 RX ADMIN — DEXAMETHASONE SODIUM PHOSPHATE 8 MG: 4 INJECTION INTRA-ARTICULAR; INTRALESIONAL; INTRAMUSCULAR; INTRAVENOUS; SOFT TISSUE at 01:02

## 2022-02-01 RX ADMIN — CYCLOBENZAPRINE 10 MG: 10 TABLET, FILM COATED ORAL at 01:02

## 2022-02-01 RX ADMIN — KETOROLAC TROMETHAMINE 30 MG: 30 INJECTION, SOLUTION INTRAMUSCULAR at 01:02

## 2022-02-01 NOTE — FIRST PROVIDER EVALUATION
Emergency Department TeleTriage Encounter Note      CHIEF COMPLAINT    Chief Complaint   Patient presents with    Back Pain     Pt is having lower back pain x2 days. Pt states he was standing in the living room and the pain hit. Pt states pain is radiating down right leg       VITAL SIGNS   Initial Vitals [02/01/22 1040]   BP Pulse Resp Temp SpO2   121/77 96 18 97.8 °F (36.6 °C) 96 %      MAP       --            ALLERGIES    Review of patient's allergies indicates:   Allergen Reactions    Fish containing products Hives     Seafood, shrimp    Iodine and iodide containing products     Naproxen sodium Swelling and Rash       PROVIDER TRIAGE NOTE  Patient is a 41-year-old male who presents with lower back pain.  He denies injury.  He denies numbness or tingling to lower extremities.  He denies loss of bowel or bladder control.    Initial orders will be placed and care will be transferred to an alternate provider when patient is roomed for a full evaluation. Any additional orders and the final disposition will be determined by that provider.        ORDERS  Labs Reviewed - No data to display    ED Orders (720h ago, onward)    None            Virtual Visit Note: The provider triage portion of this emergency department evaluation and documentation was performed via [employer], a HIPAA-compliant telemedicine application, in concert with a tele-presenter in the room. A face to face patient evaluation with one of my colleagues will occur once the patient is placed in an emergency department room.      DISCLAIMER: This note was prepared with [employer]WAKU WAKU ???? voice recognition transcription software. Garbled syntax, mangled pronouns, and other bizarre constructions may be attributed to that software system.

## 2022-02-01 NOTE — ED PROVIDER NOTES
Encounter Date: 2/1/2022    SCRIBE #1 NOTE: I, Candy Granda, am scribing for, and in the presence of,  Maya Steven DO. I have scribed the following portions of the note - Other sections scribed: HPI; ROS; PE.       History     Chief Complaint   Patient presents with    Back Pain     Pt is having lower back pain x2 days. Pt states he was standing in the living room and the pain hit. Pt states pain is radiating down right leg     The patient is a 41 y.o. male with Chronic Back Pain who presents to the ED for back back radiating to his right leg onset 3 days ago. Patient reports having similar symptoms in the past, but states that this is the worst pain. He denies dysuria or frequency. No further complaints at this time.    The history is provided by the patient. No  was used.     Review of patient's allergies indicates:   Allergen Reactions    Fish containing products Hives     Seafood, shrimp    Iodine and iodide containing products     Naproxen Hives    Naproxen sodium Swelling and Rash     Past Medical History:   Diagnosis Date    Ankle fracture, right     Chronic back pain      History reviewed. No pertinent surgical history.  Family History   Problem Relation Age of Onset    Schizophrenia Mother     Heart disease Father         CHF    Bipolar disorder Sister      Social History     Tobacco Use    Smoking status: Current Every Day Smoker     Packs/day: 1.00     Types: Cigarettes    Smokeless tobacco: Current User   Substance Use Topics    Alcohol use: No     Comment: remote heavy    Drug use: No     Review of Systems   Constitutional: Negative for fever.   HENT: Negative for congestion.    Eyes: Negative for visual disturbance.   Respiratory: Negative for shortness of breath.    Cardiovascular: Negative for chest pain.   Gastrointestinal: Negative for abdominal pain.   Genitourinary: Negative for dysuria and flank pain.   Musculoskeletal: Positive for back pain and myalgias.    Neurological: Negative for headaches.   Psychiatric/Behavioral: Negative for confusion.   All other systems reviewed and are negative.      Physical Exam     Initial Vitals [02/01/22 1040]   BP Pulse Resp Temp SpO2   121/77 96 18 97.8 °F (36.6 °C) 96 %      MAP       --         Patient gave consent to have physical exam performed.  Physical Exam    Nursing note and vitals reviewed.  Constitutional: He appears well-developed and well-nourished.   HENT:   Head: Normocephalic and atraumatic.   Right Ear: External ear normal.   Left Ear: External ear normal.   Mouth/Throat: Oropharynx is clear and moist.   Eyes: Conjunctivae and EOM are normal. Pupils are equal, round, and reactive to light.   Neck: Neck supple.   Normal range of motion.  Cardiovascular: Normal rate, regular rhythm, normal heart sounds and intact distal pulses. Exam reveals no gallop and no friction rub.    No murmur heard.  Pulmonary/Chest: Breath sounds normal. No respiratory distress. He has no wheezes. He has no rhonchi. He has no rales.   Abdominal: Abdomen is soft. Bowel sounds are normal. He exhibits no distension. There is no abdominal tenderness. There is no rebound and no guarding.   Genitourinary:    Genitourinary Comments: Patient denies loss of bowel or bladder control.      Musculoskeletal:         General: No tenderness or edema. Normal range of motion.      Cervical back: Normal, normal range of motion and neck supple.      Thoracic back: Normal.      Lumbar back: Normal.      Right lower leg: No deformity or bony tenderness.      Left lower leg: No deformity or bony tenderness.      Comments: Patient has no bony tenderness, deformities or spasms. Muscle strength is 5/5 bilaterally and lower extremity reflex are normal.      Neurological: He is alert and oriented to person, place, and time.   Skin: Skin is warm and dry.   Psychiatric: He has a normal mood and affect. His behavior is normal.         ED Course   Procedures           Medications   cyclobenzaprine tablet 10 mg (10 mg Oral Given 2/1/22 1308)   ketorolac injection 30 mg (30 mg Intramuscular Given 2/1/22 1307)   dexamethasone injection 8 mg (8 mg Intramuscular Given 2/1/22 1308)     Medical Decision Making:   History:   Old Medical Records: I decided to obtain old medical records.  Clinical Tests:   Lab Tests: Ordered and Reviewed  Chief complaint:  back pain radiating to right leg onset 3 days ago.  Differential diagnosis: Lumbago. Sciatica. Muscle Strain. UTI. Back Pain.     Treatment in the ED: PE,  cyclobenzaprine tablet 10 mg   dexamethasone injection 8 mg   ketorolac injection 30 mg   Patient refused to provide a urine sample.  Patient reports feeling better after treatment in the ER.      Discussed treatment, prescriptions, labs, and imaging results.    Discharge home with   acetaminophen (TYLENOL) 500 MG tablet       cyclobenzaprine (FLEXERIL) 10 MG tablet        Fill and take prescriptions as directed.  Return to the ED if symptoms worsen or do not resolve.   Answered questions and discussed discharge plan.    Patient feels better and is ready for discharge.  Follow up with PCP/specialist in 1 day.        Scribe Attestation:   Scribe #1: I performed the above scribed service and the documentation accurately describes the services I performed. I attest to the accuracy of the note.                I, Dr. Maya Steven, personally performed the services described in this documentation. This document was produced by a scribe under my direction and in my presence. All medical record entries made by the scribe were at my direction and in my presence.  I have reviewed the chart and agree that the record reflects my personal performance and is accurate and complete. Maya Steven, .     02/01/2022 4:25 PM    Clinical Impression:   Final diagnoses:  [M54.41] Acute right-sided low back pain with right-sided sciatica (Primary)          ED Disposition Condition    Discharge  Stable        ED Prescriptions     Medication Sig Dispense Start Date End Date Auth. Provider    acetaminophen (TYLENOL) 500 MG tablet Take 2 tablets (1,000 mg total) by mouth every 6 (six) hours as needed for Pain. 30 tablet 2/1/2022  Maya Steven DO    cyclobenzaprine (FLEXERIL) 10 MG tablet Take 1 tablet (10 mg total) by mouth 3 (three) times daily as needed for Muscle spasms. 15 tablet 2/1/2022 2/6/2022 Maya Steven DO        Follow-up Information     Follow up With Specialties Details Why Contact Info    Sylvia Rhoades PA-C Neurosurgery Schedule an appointment as soon as possible for a visit in 1 day For further evaluation of back pain [address] [facility]  [address]  [facility] [address]  [phone]      Fred Resendiz MD Internal Medicine Schedule an appointment as soon as possible for a visit in 1 day  [address]  [place] [address]  [phone]             Maya Steven DO  02/01/22 1629

## 2022-02-01 NOTE — Clinical Note
"Abner Acuña" Pedro was seen and treated in our emergency department on 2/1/2022.  He may return to work on 02/02/2022.       If you have any questions or concerns, please don't hesitate to call.      Maya Steven, DO"

## 2022-06-08 ENCOUNTER — HOSPITAL ENCOUNTER (EMERGENCY)
Facility: HOSPITAL | Age: 42
Discharge: HOME | End: 2022-06-08
Attending: EMERGENCY MEDICINE
Payer: COMMERCIAL

## 2022-06-08 VITALS
RESPIRATION RATE: 20 BRPM | WEIGHT: 200 LBS | DIASTOLIC BLOOD PRESSURE: 90 MMHG | SYSTOLIC BLOOD PRESSURE: 127 MMHG | BODY MASS INDEX: 28.63 KG/M2 | HEIGHT: 70 IN | OXYGEN SATURATION: 97 % | TEMPERATURE: 98 F | HEART RATE: 92 BPM

## 2022-06-08 DIAGNOSIS — K04.7 DENTAL ABSCESS: Primary | ICD-10-CM

## 2022-06-08 PROCEDURE — 99283 EMERGENCY DEPT VISIT LOW MDM: CPT | Mod: ER

## 2022-06-08 RX ORDER — CLINDAMYCIN HYDROCHLORIDE 150 MG/1
150 CAPSULE ORAL 4 TIMES DAILY
Qty: 28 CAPSULE | Refills: 0 | Status: SHIPPED | OUTPATIENT
Start: 2022-06-08 | End: 2022-06-15

## 2022-06-09 NOTE — ED PROVIDER NOTES
Encounter Date: 6/8/2022    SCRIBE #1 NOTE: I, Ed Dow, am scribing for, and in the presence of,  Rafiq Hopkins MD. I have scribed the following portions of the note - Other sections scribed: HPI, ROS, PE.       History     Chief Complaint   Patient presents with    Dental Pain     PT C/O FRONT TOOTH AND MOUTH PAIN SINCE THIS AM, DENIES INJURY     Abner Vasquez 41 y.o. male, with no known pertinent PMHx, presents to the ED with frontal dental pain this afternoon. Patient reports brushing his teeth this AM and feeling pain a few hours after. Patient has no other complaints at this present time.      The history is provided by the patient. No  was used.     Review of patient's allergies indicates:   Allergen Reactions    Fish containing products Hives     Seafood, shrimp    Iodine and iodide containing products     Naproxen Hives    Naproxen sodium Swelling and Rash     Past Medical History:   Diagnosis Date    Ankle fracture, right     Chronic back pain      History reviewed. No pertinent surgical history.  Family History   Problem Relation Age of Onset    Schizophrenia Mother     Heart disease Father         CHF    Bipolar disorder Sister      Social History     Tobacco Use    Smoking status: Current Every Day Smoker     Packs/day: 1.00     Types: Cigarettes    Smokeless tobacco: Current User   Substance Use Topics    Alcohol use: No     Comment: remote heavy    Drug use: No     Review of Systems   Constitutional: Negative.  Negative for fever.   HENT: Positive for dental problem. Negative for sore throat.    Eyes: Negative.    Respiratory: Negative.  Negative for shortness of breath.    Cardiovascular: Negative.  Negative for chest pain.   Gastrointestinal: Negative.  Negative for nausea and vomiting.   Endocrine: Negative.    Genitourinary: Negative.  Negative for dysuria.   Musculoskeletal: Negative.  Negative for myalgias.   Skin: Negative for rash.    Allergic/Immunologic: Negative.    Neurological: Negative.  Negative for headaches.   Hematological: Negative.  Negative for adenopathy.   Psychiatric/Behavioral: Negative.  Negative for behavioral problems.   All other systems reviewed and are negative.      Physical Exam     Initial Vitals [06/08/22 2031]   BP Pulse Resp Temp SpO2   (!) 127/90 92 20 98.1 °F (36.7 °C) 97 %      MAP       --         Physical Exam    Nursing note and vitals reviewed.  Constitutional: He appears well-developed and well-nourished.   HENT:   Head: Normocephalic and atraumatic.   Dental tenderness noted to the apex of the right maxillary central incisor.   Eyes: Conjunctivae and EOM are normal.   Neck: Neck supple.   Normal range of motion.  Cardiovascular: Normal rate and intact distal pulses.   Pulmonary/Chest: Effort normal. No respiratory distress.   Abdominal: Abdomen is soft. There is no abdominal tenderness.   Musculoskeletal:         General: Tenderness present. Normal range of motion.      Cervical back: Normal range of motion and neck supple.     Neurological: He is alert and oriented to person, place, and time.   Skin: Skin is warm and dry.   Psychiatric: He has a normal mood and affect. His behavior is normal.         ED Course   Procedures  Labs Reviewed - No data to display       Imaging Results    None          Medications - No data to display  Medical Decision Making:   History:   Old Medical Records: I decided to obtain old medical records.          Scribe Attestation:   Scribe #1: I performed the above scribed service and the documentation accurately describes the services I performed. I attest to the accuracy of the note.               This document was produced by a scribe under my direction and in my presence. I agree with the content of the note and have made any necessary edits.     Rafiq Hopkins MD    06/09/2022 5:25 AM    Clinical Impression:   Final diagnoses:  [K04.7] Dental abscess (Primary)          ED  Disposition Condition    Discharge Stable        ED Prescriptions     Medication Sig Dispense Start Date End Date Auth. Provider    clindamycin (CLEOCIN) 150 MG capsule Take 1 capsule (150 mg total) by mouth 4 (four) times daily. for 7 days 28 capsule 6/8/2022 6/15/2022 Rafiq Hopkins MD        Follow-up Information     Follow up With Specialties Details Why Contact Info    Dentist ASAP  Today             Rafiq Hopkins MD  06/09/22 6188